# Patient Record
Sex: FEMALE | Race: WHITE | NOT HISPANIC OR LATINO | URBAN - METROPOLITAN AREA
[De-identification: names, ages, dates, MRNs, and addresses within clinical notes are randomized per-mention and may not be internally consistent; named-entity substitution may affect disease eponyms.]

---

## 2018-07-19 ENCOUNTER — EMERGENCY (EMERGENCY)
Facility: HOSPITAL | Age: 33
LOS: 1 days | Discharge: HOME | End: 2018-07-19
Attending: EMERGENCY MEDICINE
Payer: COMMERCIAL

## 2018-07-19 VITALS
RESPIRATION RATE: 20 BRPM | DIASTOLIC BLOOD PRESSURE: 63 MMHG | HEART RATE: 100 BPM | SYSTOLIC BLOOD PRESSURE: 98 MMHG | OXYGEN SATURATION: 100 % | TEMPERATURE: 99 F

## 2018-07-19 LAB
ALBUMIN SERPL ELPH-MCNC: 4.2 G/DL — SIGNIFICANT CHANGE UP (ref 3.3–5)
ALP SERPL-CCNC: 42 U/L — SIGNIFICANT CHANGE UP (ref 40–120)
ALT FLD-CCNC: 14 U/L — SIGNIFICANT CHANGE UP (ref 10–45)
ANION GAP SERPL CALC-SCNC: 12 MMOL/L — SIGNIFICANT CHANGE UP (ref 5–17)
AST SERPL-CCNC: 39 U/L — SIGNIFICANT CHANGE UP (ref 10–40)
BASE EXCESS BLDV CALC-SCNC: 1.2 MMOL/L — SIGNIFICANT CHANGE UP (ref -2–2)
BASOPHILS # BLD AUTO: 0 K/UL — SIGNIFICANT CHANGE UP (ref 0–0.2)
BASOPHILS NFR BLD AUTO: 0.3 % — SIGNIFICANT CHANGE UP (ref 0–2)
BILIRUB SERPL-MCNC: 0.5 MG/DL — SIGNIFICANT CHANGE UP (ref 0.2–1.2)
BUN SERPL-MCNC: 11 MG/DL — SIGNIFICANT CHANGE UP (ref 7–23)
CA-I SERPL-SCNC: 1.1 MMOL/L — LOW (ref 1.12–1.3)
CALCIUM SERPL-MCNC: 8.6 MG/DL — SIGNIFICANT CHANGE UP (ref 8.4–10.5)
CHLORIDE BLDV-SCNC: 103 MMOL/L — SIGNIFICANT CHANGE UP (ref 96–108)
CHLORIDE SERPL-SCNC: 100 MMOL/L — SIGNIFICANT CHANGE UP (ref 96–108)
CO2 BLDV-SCNC: 26 MMOL/L — SIGNIFICANT CHANGE UP (ref 22–30)
CO2 SERPL-SCNC: 22 MMOL/L — SIGNIFICANT CHANGE UP (ref 22–31)
CREAT SERPL-MCNC: 0.71 MG/DL — SIGNIFICANT CHANGE UP (ref 0.5–1.3)
EOSINOPHIL # BLD AUTO: 0 K/UL — SIGNIFICANT CHANGE UP (ref 0–0.5)
EOSINOPHIL NFR BLD AUTO: 0.3 % — SIGNIFICANT CHANGE UP (ref 0–6)
GAS PNL BLDV: 132 MMOL/L — LOW (ref 136–145)
GAS PNL BLDV: SIGNIFICANT CHANGE UP
GAS PNL BLDV: SIGNIFICANT CHANGE UP
GLUCOSE BLDV-MCNC: 93 MG/DL — SIGNIFICANT CHANGE UP (ref 70–99)
GLUCOSE SERPL-MCNC: 103 MG/DL — HIGH (ref 70–99)
HCG SERPL-ACNC: <2 MIU/ML — SIGNIFICANT CHANGE UP
HCO3 BLDV-SCNC: 25 MMOL/L — SIGNIFICANT CHANGE UP (ref 21–29)
HCT VFR BLD CALC: 41.4 % — SIGNIFICANT CHANGE UP (ref 34.5–45)
HCT VFR BLDA CALC: 39 % — SIGNIFICANT CHANGE UP (ref 39–50)
HGB BLD CALC-MCNC: 12.8 G/DL — SIGNIFICANT CHANGE UP (ref 11.5–15.5)
HGB BLD-MCNC: 13.4 G/DL — SIGNIFICANT CHANGE UP (ref 11.5–15.5)
LACTATE BLDV-MCNC: 1.9 MMOL/L — SIGNIFICANT CHANGE UP (ref 0.7–2)
LYMPHOCYTES # BLD AUTO: 0.9 K/UL — LOW (ref 1–3.3)
LYMPHOCYTES # BLD AUTO: 6.7 % — LOW (ref 13–44)
MCHC RBC-ENTMCNC: 28.4 PG — SIGNIFICANT CHANGE UP (ref 27–34)
MCHC RBC-ENTMCNC: 32.5 GM/DL — SIGNIFICANT CHANGE UP (ref 32–36)
MCV RBC AUTO: 87.5 FL — SIGNIFICANT CHANGE UP (ref 80–100)
MONOCYTES # BLD AUTO: 0.6 K/UL — SIGNIFICANT CHANGE UP (ref 0–0.9)
MONOCYTES NFR BLD AUTO: 5 % — SIGNIFICANT CHANGE UP (ref 2–14)
NEUTROPHILS # BLD AUTO: 11.4 K/UL — HIGH (ref 1.8–7.4)
NEUTROPHILS NFR BLD AUTO: 87.7 % — HIGH (ref 43–77)
PCO2 BLDV: 37 MMHG — SIGNIFICANT CHANGE UP (ref 35–50)
PH BLDV: 7.44 — SIGNIFICANT CHANGE UP (ref 7.35–7.45)
PLAT MORPH BLD: NORMAL — SIGNIFICANT CHANGE UP
PLATELET # BLD AUTO: 287 K/UL — SIGNIFICANT CHANGE UP (ref 150–400)
PO2 BLDV: 34 MMHG — SIGNIFICANT CHANGE UP (ref 25–45)
POTASSIUM BLDV-SCNC: 3.3 MMOL/L — LOW (ref 3.5–5.3)
POTASSIUM SERPL-MCNC: 4.7 MMOL/L — SIGNIFICANT CHANGE UP (ref 3.5–5.3)
POTASSIUM SERPL-SCNC: 4.7 MMOL/L — SIGNIFICANT CHANGE UP (ref 3.5–5.3)
PROT SERPL-MCNC: 6.7 G/DL — SIGNIFICANT CHANGE UP (ref 6–8.3)
RBC # BLD: 4.73 M/UL — SIGNIFICANT CHANGE UP (ref 3.8–5.2)
RBC # FLD: 11.3 % — SIGNIFICANT CHANGE UP (ref 10.3–14.5)
RBC BLD AUTO: NORMAL — SIGNIFICANT CHANGE UP
SAO2 % BLDV: 62 % — LOW (ref 67–88)
SODIUM SERPL-SCNC: 134 MMOL/L — LOW (ref 135–145)
WBC # BLD: 13 K/UL — HIGH (ref 3.8–10.5)
WBC # FLD AUTO: 13 K/UL — HIGH (ref 3.8–10.5)

## 2018-07-19 PROCEDURE — 76830 TRANSVAGINAL US NON-OB: CPT | Mod: 26

## 2018-07-19 PROCEDURE — 99284 EMERGENCY DEPT VISIT MOD MDM: CPT

## 2018-07-19 PROCEDURE — 93975 VASCULAR STUDY: CPT | Mod: 26

## 2018-07-19 RX ORDER — ACETAMINOPHEN 500 MG
1000 TABLET ORAL ONCE
Qty: 0 | Refills: 0 | Status: COMPLETED | OUTPATIENT
Start: 2018-07-19 | End: 2018-07-19

## 2018-07-19 RX ADMIN — Medication 1000 MILLIGRAM(S): at 22:23

## 2018-07-19 RX ADMIN — Medication 400 MILLIGRAM(S): at 21:23

## 2018-07-19 NOTE — ED PROVIDER NOTE - ATTENDING CONTRIBUTION TO CARE
Patient receiving fertility treatments, MADISYN Kelsey also with history of PCOS presenting c/o bilateral lower abdominal cramping.  No noted vaginal bleeding.  Recently decreased fertility medications as estrogen levels spiked per patient.  Denying radiation to back or elsewhere in abdomen, no nausea/vomiting/constipations.  , reporting HCG negative when tested in office earlier this week.    On exam patient well appearing, in no apparent distress, vital signs within normal limits, RRR S1/S2, lungs clear to ascultation bilaterally, abdomen soft, minimal bilateral lower abdominal tenderness.    DDx ovarian cyst, less likely torsion given benign exam, less likely ectopic given negative HCG within one week, possible severe mittelschmertz given on fertility treatments, do not suspect non GYN cause of symptoms by history or exam, plan for labs, TVUS, OB consultation given fertility treatments.

## 2018-07-19 NOTE — ED PROVIDER NOTE - OBJECTIVE STATEMENT
33 year old female PMH PCOS presents with lower abdominal pain. Patient is undergoing IVF tx. Was told yesterday her estrogen was too high and today woke up with pain, R more than L. Took Tylenol without relief. Reports had a fever 102 at home. Denies nausea, vomiting, diarrhea, constipation, vaginal bleeding/discharge, dysuria, hematuria. Has had ovarian cysts; never had them rupture.   OB/Gyn: Dr. Kelsey and Dr. Smallwood.

## 2018-07-19 NOTE — ED ADULT NURSE NOTE - OBJECTIVE STATEMENT
Patient presented to ED ambulatory c/o suprapubic pain since last night, pain is sharp as per statement, patient took tylenol at home initially, stating today in the morning she was c/o chills and fever as high as 102. Patient currently in IVF (in vitro fertilization) and has a history of PCOS. After receiving IV tylenol pain went away.

## 2018-07-19 NOTE — ED PROVIDER NOTE - PHYSICAL EXAMINATION
General appearance: NAD, conversant, afebrile    Eyes: anicteric sclerae, moist conjunctivae; no lid-lag; Pupils equal, round and reactive to light; Extraocular muscles intact   HENT: Atraumatic; oropharynx clear with moist mucous membranes and no mucosal ulcerations; normal hard and soft palate; no pharyngeal erythema or exudate   Neck: Trachea midline; Full range of motion, supple; no thyromegaly or lymphadenopathy   Pulm: Lungs clear to auscultation bilaterally, with normal respiratory effort and no intercostal retractions; normal work of breathing   CV: Regular Rhythm and Rate; Normal S1, S2; No murmurs, rubs, or gallops. 2+ peripheral pulses.   Abdomen: Soft, +suprapubic ttp, R>L., non-distended; no masses or hepatosplenomegaly.   Extremities: No peripheral edema or extremity lymphadenopathy. 5/5 strength in all four extremities.   Skin: Normal temperature, turgor and texture; no rash, ulcers or subcutaneous nodules   Psych: Appropriate affect, cooperative; alert and oriented to person, place and time  GYN: KIRK Montgomery as chaperone. Normal external genitalia. Scant whitish discharge in vault. No CMT. Mild b/l adnexal TTP.

## 2018-07-19 NOTE — ED PROVIDER NOTE - CARE PLAN
Principal Discharge DX:	Abdominal pain, unspecified abdominal location  Assessment and plan of treatment:	You were seen today for abdominal pain. Your ultrasound was ne Principal Discharge DX:	Abdominal pain, unspecified abdominal location  Assessment and plan of treatment:	You were seen today for abdominal pain. Your ultrasound did not demonstrate any concerning findings. Please follow up with your endocrinologist tomorrow.  If you have worsening abdominal pain, vaginal discharge, fevers, chills or any other concerning symptoms, please come back to the emergency room.

## 2018-07-19 NOTE — ED PROVIDER NOTE - NS ED ROS FT
General: +fever at home. Np chills, weight loss/weight gain.  HENT: denies nasal congestion, sore throat, rhinorrhea, ear pain  Eyes: denies visual changes, blurred vision, eye discharge, eye redness  Neck: denies neck pain, neck swelling  CV: denies chest pain, palpitations  Resp: denies difficulty breathing, cough  Abdominal: +abdominal pain. No nausea, vomiting, diarrhea, blood in stool, dark stool  GYN: see HPI  MSK: denies muscle aches, bony pain, leg pain, leg swelling  Neuro: denies headaches, numbness, tingling, dizziness, lightheadedness.  Skin: denies rashes, cuts, bruises

## 2018-07-19 NOTE — ED PROVIDER NOTE - MEDICAL DECISION MAKING DETAILS
33F PMH PCOS undergoing IVF presenting with abdominal pain. Concerning for cyst rupture vs torsion. Will obtain basic labs, hcg, TVUS. Ob/gyn consult.

## 2018-07-19 NOTE — ED PROVIDER NOTE - PROGRESS NOTE DETAILS
KIARA Lange MD : pt seen by ob in ED, tvus wnl. urine nonspecific, additional verbal instructions regarding diagnosis, return precautions and follow up plan given to pt and/or family.

## 2018-07-19 NOTE — ED PROVIDER NOTE - PLAN OF CARE
You were seen today for abdominal pain. Your ultrasound was ne You were seen today for abdominal pain. Your ultrasound did not demonstrate any concerning findings. Please follow up with your endocrinologist tomorrow.  If you have worsening abdominal pain, vaginal discharge, fevers, chills or any other concerning symptoms, please come back to the emergency room.

## 2018-07-20 VITALS
TEMPERATURE: 98 F | OXYGEN SATURATION: 100 % | HEART RATE: 72 BPM | SYSTOLIC BLOOD PRESSURE: 94 MMHG | RESPIRATION RATE: 16 BRPM | DIASTOLIC BLOOD PRESSURE: 56 MMHG

## 2018-07-20 DIAGNOSIS — E28.2 POLYCYSTIC OVARIAN SYNDROME: ICD-10-CM

## 2018-07-20 LAB
APPEARANCE UR: CLEAR — SIGNIFICANT CHANGE UP
BILIRUB UR-MCNC: NEGATIVE — SIGNIFICANT CHANGE UP
COLOR SPEC: COLORLESS — SIGNIFICANT CHANGE UP
DIFF PNL FLD: NEGATIVE — SIGNIFICANT CHANGE UP
GLUCOSE UR QL: NEGATIVE — SIGNIFICANT CHANGE UP
KETONES UR-MCNC: NEGATIVE — SIGNIFICANT CHANGE UP
LEUKOCYTE ESTERASE UR-ACNC: NEGATIVE — SIGNIFICANT CHANGE UP
NITRITE UR-MCNC: NEGATIVE — SIGNIFICANT CHANGE UP
PH UR: 6.5 — SIGNIFICANT CHANGE UP (ref 5–8)
PROT UR-MCNC: NEGATIVE — SIGNIFICANT CHANGE UP
SP GR SPEC: <1.005 — LOW (ref 1.01–1.02)
UROBILINOGEN FLD QL: NEGATIVE — SIGNIFICANT CHANGE UP

## 2018-07-20 PROCEDURE — 84132 ASSAY OF SERUM POTASSIUM: CPT

## 2018-07-20 PROCEDURE — 80053 COMPREHEN METABOLIC PANEL: CPT

## 2018-07-20 PROCEDURE — 99284 EMERGENCY DEPT VISIT MOD MDM: CPT | Mod: 25

## 2018-07-20 PROCEDURE — 82435 ASSAY OF BLOOD CHLORIDE: CPT

## 2018-07-20 PROCEDURE — 82803 BLOOD GASES ANY COMBINATION: CPT

## 2018-07-20 PROCEDURE — 85014 HEMATOCRIT: CPT

## 2018-07-20 PROCEDURE — 82947 ASSAY GLUCOSE BLOOD QUANT: CPT

## 2018-07-20 PROCEDURE — 85027 COMPLETE CBC AUTOMATED: CPT

## 2018-07-20 PROCEDURE — 83605 ASSAY OF LACTIC ACID: CPT

## 2018-07-20 PROCEDURE — 96374 THER/PROPH/DIAG INJ IV PUSH: CPT

## 2018-07-20 PROCEDURE — 84702 CHORIONIC GONADOTROPIN TEST: CPT

## 2018-07-20 PROCEDURE — 84295 ASSAY OF SERUM SODIUM: CPT

## 2018-07-20 PROCEDURE — 93975 VASCULAR STUDY: CPT

## 2018-07-20 PROCEDURE — 76830 TRANSVAGINAL US NON-OB: CPT

## 2018-07-20 PROCEDURE — 82330 ASSAY OF CALCIUM: CPT

## 2018-07-20 PROCEDURE — 81003 URINALYSIS AUTO W/O SCOPE: CPT

## 2018-07-20 NOTE — CONSULT NOTE ADULT - SUBJECTIVE AND OBJECTIVE BOX
33y  LMP 18 with history of PCOS who presents with RLQ pain throughout the day. She denies any inciting event. She took Tylenol at home with minimal relief. She also notes that she had a fever at home up to 102 Farenheit today with some associated nausea. She vomited once in the morning, but the nausea has since resolved. She also does note some urinary frequency. She denies any bleeding or discharge. She denies any shortness of breath, chest pain, constipation, diarrhea, dysuria and hematuria.        OB/GYN HISTORY:   PAST MEDICAL & SURGICAL HISTORY:  PCOS (polycystic ovarian syndrome)  No significant past surgical history    Allergies    No Known Allergies    Intolerances      MEDICATIONS: metformin    FAMILY HISTORY:  No pertinent family history in first degree relatives    SOCIAL HISTORY:  denies tobacco, alcohol, and illicit drugs    Name of GYN Physician: Low       Vital Signs Last 24 Hrs  T(C): 37 (2018 23:00), Max: 37.1 (2018 19:50)  T(F): 98.6 (2018 23:00), Max: 98.8 (2018 19:50)  HR: 86 (2018 23:00) (86 - 100)  BP: 95/76 (2018 23:00) (95/76 - 98/63)  BP(mean): --  RR: 17 (2018 23:00) (17 - 20)  SpO2: 100% (2018 23:00) (100% - 100%)    PHYSICAL EXAM:      Constitutional: alert and oriented x 3    Respiratory: clear    Cardiovascular: regular rate and rhythm    Gastrointestinal: soft, mildly tender in b/l lower quadrants and suprapubically, + bowel sounds. no palpable masses    Cervix: closed/ long, no CMT  Uterus: normal size, non tender  Adnexa: mild tenderness bilaterally, no palpable masses    LABS:                        13.4   13.0  )-----------( 287      ( 2018 22:00 )             41.4     07-    134<L>  |  100  |  11  ----------------------------<  103<H>  4.7   |  22  |  0.71    Ca    8.6      2018 22:00    TPro  6.7  /  Alb  4.2  /  TBili  0.5  /  DBili  x   /  AST  39  /  ALT  14  /  AlkPhos  42                RADIOLOGY & ADDITIONAL STUDIES:  TVUS (): hyperstimulated ovaries, trace free fluid in the cul-de-sac, good flow to both ovaries

## 2018-07-20 NOTE — CONSULT NOTE ADULT - PROBLEM SELECTOR RECOMMENDATION 9
-recommend UA with elevated white count and fevers at home with suprapubic tenderness  -ovarian torsion unlikely, good flow on sono, and benign physical exam  -pt to follow up with endocrinologist tomorrow    Discussed with Dr. Low Gresham MD PGY2

## 2018-10-24 ENCOUNTER — TRANSCRIPTION ENCOUNTER (OUTPATIENT)
Age: 33
End: 2018-10-24

## 2018-10-24 ENCOUNTER — OUTPATIENT (OUTPATIENT)
Dept: OUTPATIENT SERVICES | Facility: HOSPITAL | Age: 33
LOS: 1 days | End: 2018-10-24
Payer: COMMERCIAL

## 2018-10-24 VITALS
SYSTOLIC BLOOD PRESSURE: 117 MMHG | RESPIRATION RATE: 20 BRPM | TEMPERATURE: 98 F | DIASTOLIC BLOOD PRESSURE: 76 MMHG | HEIGHT: 66 IN | HEART RATE: 62 BPM | WEIGHT: 130.95 LBS | OXYGEN SATURATION: 99 %

## 2018-10-24 DIAGNOSIS — O02.1 MISSED ABORTION: ICD-10-CM

## 2018-10-24 LAB
ANION GAP SERPL CALC-SCNC: 14 MMOL/L — SIGNIFICANT CHANGE UP (ref 5–17)
BLD GP AB SCN SERPL QL: NEGATIVE — SIGNIFICANT CHANGE UP
BUN SERPL-MCNC: 7 MG/DL — SIGNIFICANT CHANGE UP (ref 7–23)
CALCIUM SERPL-MCNC: 9.2 MG/DL — SIGNIFICANT CHANGE UP (ref 8.4–10.5)
CHLORIDE SERPL-SCNC: 103 MMOL/L — SIGNIFICANT CHANGE UP (ref 96–108)
CO2 SERPL-SCNC: 21 MMOL/L — LOW (ref 22–31)
CREAT SERPL-MCNC: 0.56 MG/DL — SIGNIFICANT CHANGE UP (ref 0.5–1.3)
GLUCOSE SERPL-MCNC: 96 MG/DL — SIGNIFICANT CHANGE UP (ref 70–99)
HCT VFR BLD CALC: 37.9 % — SIGNIFICANT CHANGE UP (ref 34.5–45)
HGB BLD-MCNC: 13.1 G/DL — SIGNIFICANT CHANGE UP (ref 11.5–15.5)
MCHC RBC-ENTMCNC: 29.3 PG — SIGNIFICANT CHANGE UP (ref 27–34)
MCHC RBC-ENTMCNC: 34.6 GM/DL — SIGNIFICANT CHANGE UP (ref 32–36)
MCV RBC AUTO: 84.8 FL — SIGNIFICANT CHANGE UP (ref 80–100)
PLATELET # BLD AUTO: 334 K/UL — SIGNIFICANT CHANGE UP (ref 150–400)
POTASSIUM SERPL-MCNC: 3.9 MMOL/L — SIGNIFICANT CHANGE UP (ref 3.5–5.3)
POTASSIUM SERPL-SCNC: 3.9 MMOL/L — SIGNIFICANT CHANGE UP (ref 3.5–5.3)
RBC # BLD: 4.47 M/UL — SIGNIFICANT CHANGE UP (ref 3.8–5.2)
RBC # FLD: 12.4 % — SIGNIFICANT CHANGE UP (ref 10.3–14.5)
RH IG SCN BLD-IMP: POSITIVE — SIGNIFICANT CHANGE UP
SODIUM SERPL-SCNC: 138 MMOL/L — SIGNIFICANT CHANGE UP (ref 135–145)
WBC # BLD: 10.09 K/UL — SIGNIFICANT CHANGE UP (ref 3.8–10.5)
WBC # FLD AUTO: 10.09 K/UL — SIGNIFICANT CHANGE UP (ref 3.8–10.5)

## 2018-10-24 PROCEDURE — 86900 BLOOD TYPING SEROLOGIC ABO: CPT

## 2018-10-24 PROCEDURE — 86850 RBC ANTIBODY SCREEN: CPT

## 2018-10-24 PROCEDURE — 86901 BLOOD TYPING SEROLOGIC RH(D): CPT

## 2018-10-24 PROCEDURE — G0463: CPT

## 2018-10-24 PROCEDURE — 85027 COMPLETE CBC AUTOMATED: CPT

## 2018-10-24 PROCEDURE — 80048 BASIC METABOLIC PNL TOTAL CA: CPT

## 2018-10-24 RX ORDER — SODIUM CHLORIDE 9 MG/ML
3 INJECTION INTRAMUSCULAR; INTRAVENOUS; SUBCUTANEOUS EVERY 8 HOURS
Qty: 0 | Refills: 0 | Status: DISCONTINUED | OUTPATIENT
Start: 2018-10-25 | End: 2018-11-09

## 2018-10-24 RX ORDER — ACETAMINOPHEN 500 MG
1000 TABLET ORAL ONCE
Qty: 0 | Refills: 0 | Status: COMPLETED | OUTPATIENT
Start: 2018-10-25 | End: 2018-10-25

## 2018-10-24 RX ORDER — CELECOXIB 200 MG/1
200 CAPSULE ORAL ONCE
Qty: 0 | Refills: 0 | Status: COMPLETED | OUTPATIENT
Start: 2018-10-25 | End: 2018-10-25

## 2018-10-24 RX ORDER — LIDOCAINE HCL 20 MG/ML
0.2 VIAL (ML) INJECTION ONCE
Qty: 0 | Refills: 0 | Status: DISCONTINUED | OUTPATIENT
Start: 2018-10-25 | End: 2018-11-09

## 2018-10-24 NOTE — H&P PST ADULT - RS GEN PE MLT RESP DETAILS PC
good air movement/breath sounds equal/clear to auscultation bilaterally/airway patent/normal/respirations non-labored

## 2018-10-24 NOTE — H&P PST ADULT - HISTORY OF PRESENT ILLNESS
33 yr old female A3, IVF pregnancy- 11 weeks with missed , Now coming in for D & C Suction for missed  on 10/25/18.

## 2018-10-24 NOTE — H&P PST ADULT - NSANTHOSAYNRD_GEN_A_CORE
No. KAROLYN screening performed.  STOP BANG Legend: 0-2 = LOW Risk; 3-4 = INTERMEDIATE Risk; 5-8 = HIGH Risk

## 2018-10-25 ENCOUNTER — OUTPATIENT (OUTPATIENT)
Dept: OUTPATIENT SERVICES | Facility: HOSPITAL | Age: 33
LOS: 1 days | End: 2018-10-25
Payer: COMMERCIAL

## 2018-10-25 ENCOUNTER — RESULT REVIEW (OUTPATIENT)
Age: 33
End: 2018-10-25

## 2018-10-25 VITALS
HEART RATE: 77 BPM | TEMPERATURE: 98 F | RESPIRATION RATE: 20 BRPM | OXYGEN SATURATION: 100 % | WEIGHT: 130.95 LBS | HEIGHT: 66 IN | SYSTOLIC BLOOD PRESSURE: 117 MMHG | DIASTOLIC BLOOD PRESSURE: 76 MMHG

## 2018-10-25 VITALS — DIASTOLIC BLOOD PRESSURE: 52 MMHG | SYSTOLIC BLOOD PRESSURE: 95 MMHG | OXYGEN SATURATION: 99 % | HEART RATE: 60 BPM

## 2018-10-25 DIAGNOSIS — O02.1 MISSED ABORTION: ICD-10-CM

## 2018-10-25 PROBLEM — E28.2 POLYCYSTIC OVARIAN SYNDROME: Chronic | Status: ACTIVE | Noted: 2018-07-19

## 2018-10-25 PROCEDURE — 88305 TISSUE EXAM BY PATHOLOGIST: CPT

## 2018-10-25 PROCEDURE — 88305 TISSUE EXAM BY PATHOLOGIST: CPT | Mod: 26

## 2018-10-25 PROCEDURE — 59820 CARE OF MISCARRIAGE: CPT

## 2018-10-25 RX ORDER — ONDANSETRON 8 MG/1
4 TABLET, FILM COATED ORAL ONCE
Qty: 0 | Refills: 0 | Status: COMPLETED | OUTPATIENT
Start: 2018-10-25 | End: 2018-10-25

## 2018-10-25 RX ORDER — METFORMIN HYDROCHLORIDE 850 MG/1
1.5 TABLET ORAL
Qty: 0 | Refills: 0 | COMMUNITY

## 2018-10-25 RX ORDER — OXYCODONE HYDROCHLORIDE 5 MG/1
5 TABLET ORAL ONCE
Qty: 0 | Refills: 0 | Status: DISCONTINUED | OUTPATIENT
Start: 2018-10-25 | End: 2018-10-25

## 2018-10-25 RX ORDER — HYDROMORPHONE HYDROCHLORIDE 2 MG/ML
0.25 INJECTION INTRAMUSCULAR; INTRAVENOUS; SUBCUTANEOUS
Qty: 0 | Refills: 0 | Status: DISCONTINUED | OUTPATIENT
Start: 2018-10-25 | End: 2018-10-25

## 2018-10-25 RX ORDER — SODIUM CHLORIDE 9 MG/ML
1000 INJECTION, SOLUTION INTRAVENOUS
Qty: 0 | Refills: 0 | Status: DISCONTINUED | OUTPATIENT
Start: 2018-10-25 | End: 2018-11-09

## 2018-10-25 RX ORDER — CELECOXIB 200 MG/1
200 CAPSULE ORAL ONCE
Qty: 0 | Refills: 0 | Status: COMPLETED | OUTPATIENT
Start: 2018-10-25 | End: 2018-10-25

## 2018-10-25 RX ADMIN — CELECOXIB 200 MILLIGRAM(S): 200 CAPSULE ORAL at 07:07

## 2018-10-25 RX ADMIN — ONDANSETRON 4 MILLIGRAM(S): 8 TABLET, FILM COATED ORAL at 09:01

## 2018-10-25 RX ADMIN — CELECOXIB 200 MILLIGRAM(S): 200 CAPSULE ORAL at 09:00

## 2018-10-25 RX ADMIN — Medication 1000 MILLIGRAM(S): at 07:06

## 2018-10-25 RX ADMIN — OXYCODONE HYDROCHLORIDE 5 MILLIGRAM(S): 5 TABLET ORAL at 09:00

## 2018-10-25 RX ADMIN — HYDROMORPHONE HYDROCHLORIDE 0.25 MILLIGRAM(S): 2 INJECTION INTRAMUSCULAR; INTRAVENOUS; SUBCUTANEOUS at 09:01

## 2018-10-25 NOTE — ASU DISCHARGE PLAN (ADULT/PEDIATRIC). - NURSING INSTRUCTIONS
Tylenol/Motrin as needed for pain/discomfort.  Next dose of Tylenol OK @ 1:00pm.  Next dose of Motrin (or other NSAIDs) OK @ 3:00pm.  Pads only, nothing vaginally until cleared by MD.  Follow with MD as requested; call office for appt.  You may resume any/all preop medications/supplements as before.

## 2018-10-25 NOTE — BRIEF OPERATIVE NOTE - PROCEDURE
<<-----Click on this checkbox to enter Procedure Dilation and curettage of uterus for missed   10/25/2018  8 wks twins  Active  MSPEREZ

## 2018-10-29 LAB — SURGICAL PATHOLOGY STUDY: SIGNIFICANT CHANGE UP

## 2018-11-30 NOTE — ASU PREOP CHECKLIST - NOTHING BY MOUTH SINCE
[Disease: _____________________] : Disease: [unfilled] [T: ___] : T[unfilled] [N: ___] : N[unfilled] [M: ___] : M[unfilled] [AJCC Stage: ____] : AJCC Stage: [unfilled] [de-identified] : Presented in 2007 with an abnormal mammogram. On 11/14/2007 she underwent left breast lumpectomy and sentinel lymph node biopsy for stage I A ductal/ lobular cancer , strongly ER/DC positive. Received adjuvant radiation and started adjuvant aromatase inhibitor in February 2008 ( Femara, changed to Arimidex due to cost issues).She opted for extended adjuvant hormonal therapy. Completed 10 years in February 2018.\par \par Routine blood work by PCP in  October 2017 showed normal Hgb 14.3 with ferritin 537 and iron sat 53 %). she stopped multivitamin with iron. No history of hemochromatosis in the family. 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 She was not interested in going to Blood center for phlebotomies.  [de-identified] : ductal with focal lobular features  [de-identified] : ER > 80 %  IL > 80 %  HER 2 negative  by FISH [de-identified] : Feels well. Has no complaints. Last phlebotomy in September.  24-Oct-2018 23:00

## 2019-08-19 ENCOUNTER — ASOB RESULT (OUTPATIENT)
Age: 34
End: 2019-08-19

## 2019-08-19 ENCOUNTER — LABORATORY RESULT (OUTPATIENT)
Age: 34
End: 2019-08-19

## 2019-08-19 ENCOUNTER — APPOINTMENT (OUTPATIENT)
Dept: ANTEPARTUM | Facility: CLINIC | Age: 34
End: 2019-08-19
Payer: COMMERCIAL

## 2019-08-19 PROCEDURE — 76805 OB US >/= 14 WKS SNGL FETUS: CPT

## 2019-08-19 PROCEDURE — 99202 OFFICE O/P NEW SF 15 MIN: CPT

## 2019-08-26 ENCOUNTER — ASOB RESULT (OUTPATIENT)
Age: 34
End: 2019-08-26

## 2019-08-26 ENCOUNTER — APPOINTMENT (OUTPATIENT)
Dept: ANTEPARTUM | Facility: CLINIC | Age: 34
End: 2019-08-26
Payer: COMMERCIAL

## 2019-08-26 PROCEDURE — 76817 TRANSVAGINAL US OBSTETRIC: CPT

## 2019-08-26 PROCEDURE — 99213 OFFICE O/P EST LOW 20 MIN: CPT | Mod: 25

## 2019-08-26 PROCEDURE — 76815 OB US LIMITED FETUS(S): CPT

## 2019-09-03 ENCOUNTER — APPOINTMENT (OUTPATIENT)
Dept: ANTEPARTUM | Facility: CLINIC | Age: 34
End: 2019-09-03
Payer: COMMERCIAL

## 2019-09-03 ENCOUNTER — ASOB RESULT (OUTPATIENT)
Age: 34
End: 2019-09-03

## 2019-09-03 PROCEDURE — 76817 TRANSVAGINAL US OBSTETRIC: CPT

## 2019-09-16 ENCOUNTER — APPOINTMENT (OUTPATIENT)
Dept: ANTEPARTUM | Facility: CLINIC | Age: 34
End: 2019-09-16
Payer: COMMERCIAL

## 2019-09-16 ENCOUNTER — ASOB RESULT (OUTPATIENT)
Age: 34
End: 2019-09-16

## 2019-09-16 PROCEDURE — 76811 OB US DETAILED SNGL FETUS: CPT

## 2019-09-16 PROCEDURE — 76817 TRANSVAGINAL US OBSTETRIC: CPT

## 2020-01-26 ENCOUNTER — INPATIENT (INPATIENT)
Facility: HOSPITAL | Age: 35
LOS: 1 days | Discharge: HOME | End: 2020-01-28
Attending: OBSTETRICS & GYNECOLOGY | Admitting: OBSTETRICS & GYNECOLOGY
Payer: COMMERCIAL

## 2020-01-26 VITALS — HEIGHT: 66 IN | WEIGHT: 167.55 LBS

## 2020-01-26 DIAGNOSIS — Z3A.00 WEEKS OF GESTATION OF PREGNANCY NOT SPECIFIED: ICD-10-CM

## 2020-01-26 DIAGNOSIS — O26.899 OTHER SPECIFIED PREGNANCY RELATED CONDITIONS, UNSPECIFIED TRIMESTER: ICD-10-CM

## 2020-01-26 DIAGNOSIS — Z34.80 ENCOUNTER FOR SUPERVISION OF OTHER NORMAL PREGNANCY, UNSPECIFIED TRIMESTER: ICD-10-CM

## 2020-01-26 LAB
BASOPHILS # BLD AUTO: 0.04 K/UL — SIGNIFICANT CHANGE UP (ref 0–0.2)
BASOPHILS NFR BLD AUTO: 0.4 % — SIGNIFICANT CHANGE UP (ref 0–2)
BLD GP AB SCN SERPL QL: NEGATIVE — SIGNIFICANT CHANGE UP
EOSINOPHIL # BLD AUTO: 0.14 K/UL — SIGNIFICANT CHANGE UP (ref 0–0.5)
EOSINOPHIL NFR BLD AUTO: 1.5 % — SIGNIFICANT CHANGE UP (ref 0–6)
HCT VFR BLD CALC: 38.9 % — SIGNIFICANT CHANGE UP (ref 34.5–45)
HGB BLD-MCNC: 12.7 G/DL — SIGNIFICANT CHANGE UP (ref 11.5–15.5)
IMM GRANULOCYTES NFR BLD AUTO: 0.9 % — SIGNIFICANT CHANGE UP (ref 0–1.5)
LYMPHOCYTES # BLD AUTO: 1.66 K/UL — SIGNIFICANT CHANGE UP (ref 1–3.3)
LYMPHOCYTES # BLD AUTO: 17.4 % — SIGNIFICANT CHANGE UP (ref 13–44)
MCHC RBC-ENTMCNC: 29.2 PG — SIGNIFICANT CHANGE UP (ref 27–34)
MCHC RBC-ENTMCNC: 32.6 GM/DL — SIGNIFICANT CHANGE UP (ref 32–36)
MCV RBC AUTO: 89.4 FL — SIGNIFICANT CHANGE UP (ref 80–100)
MONOCYTES # BLD AUTO: 0.91 K/UL — HIGH (ref 0–0.9)
MONOCYTES NFR BLD AUTO: 9.6 % — SIGNIFICANT CHANGE UP (ref 2–14)
NEUTROPHILS # BLD AUTO: 6.68 K/UL — SIGNIFICANT CHANGE UP (ref 1.8–7.4)
NEUTROPHILS NFR BLD AUTO: 70.2 % — SIGNIFICANT CHANGE UP (ref 43–77)
NRBC # BLD: 0 /100 WBCS — SIGNIFICANT CHANGE UP (ref 0–0)
PLATELET # BLD AUTO: 206 K/UL — SIGNIFICANT CHANGE UP (ref 150–400)
RBC # BLD: 4.35 M/UL — SIGNIFICANT CHANGE UP (ref 3.8–5.2)
RBC # FLD: 12.3 % — SIGNIFICANT CHANGE UP (ref 10.3–14.5)
RH IG SCN BLD-IMP: POSITIVE — SIGNIFICANT CHANGE UP
T PALLIDUM AB TITR SER: NEGATIVE — SIGNIFICANT CHANGE UP
WBC # BLD: 9.52 K/UL — SIGNIFICANT CHANGE UP (ref 3.8–10.5)
WBC # FLD AUTO: 9.52 K/UL — SIGNIFICANT CHANGE UP (ref 3.8–10.5)

## 2020-01-26 RX ORDER — GLYCERIN ADULT
1 SUPPOSITORY, RECTAL RECTAL AT BEDTIME
Refills: 0 | Status: DISCONTINUED | OUTPATIENT
Start: 2020-01-26 | End: 2020-01-28

## 2020-01-26 RX ORDER — ONDANSETRON 8 MG/1
4 TABLET, FILM COATED ORAL ONCE
Refills: 0 | Status: DISCONTINUED | OUTPATIENT
Start: 2020-01-26 | End: 2020-01-28

## 2020-01-26 RX ORDER — OXYCODONE HYDROCHLORIDE 5 MG/1
5 TABLET ORAL ONCE
Refills: 0 | Status: DISCONTINUED | OUTPATIENT
Start: 2020-01-26 | End: 2020-01-28

## 2020-01-26 RX ORDER — CITRIC ACID/SODIUM CITRATE 300-500 MG
15 SOLUTION, ORAL ORAL EVERY 6 HOURS
Refills: 0 | Status: DISCONTINUED | OUTPATIENT
Start: 2020-01-26 | End: 2020-01-26

## 2020-01-26 RX ORDER — IBUPROFEN 200 MG
600 TABLET ORAL EVERY 6 HOURS
Refills: 0 | Status: COMPLETED | OUTPATIENT
Start: 2020-01-26 | End: 2020-12-24

## 2020-01-26 RX ORDER — KETOROLAC TROMETHAMINE 30 MG/ML
30 SYRINGE (ML) INJECTION ONCE
Refills: 0 | Status: DISCONTINUED | OUTPATIENT
Start: 2020-01-26 | End: 2020-01-26

## 2020-01-26 RX ORDER — SIMETHICONE 80 MG/1
80 TABLET, CHEWABLE ORAL EVERY 4 HOURS
Refills: 0 | Status: DISCONTINUED | OUTPATIENT
Start: 2020-01-26 | End: 2020-01-28

## 2020-01-26 RX ORDER — MAGNESIUM HYDROXIDE 400 MG/1
30 TABLET, CHEWABLE ORAL
Refills: 0 | Status: DISCONTINUED | OUTPATIENT
Start: 2020-01-26 | End: 2020-01-28

## 2020-01-26 RX ORDER — SODIUM CHLORIDE 9 MG/ML
1000 INJECTION, SOLUTION INTRAVENOUS
Refills: 0 | Status: DISCONTINUED | OUTPATIENT
Start: 2020-01-26 | End: 2020-01-26

## 2020-01-26 RX ORDER — OXYTOCIN 10 UNIT/ML
333.33 VIAL (ML) INJECTION
Qty: 20 | Refills: 0 | Status: DISCONTINUED | OUTPATIENT
Start: 2020-01-26 | End: 2020-01-28

## 2020-01-26 RX ORDER — BENZOCAINE 10 %
1 GEL (GRAM) MUCOUS MEMBRANE EVERY 6 HOURS
Refills: 0 | Status: DISCONTINUED | OUTPATIENT
Start: 2020-01-26 | End: 2020-01-28

## 2020-01-26 RX ORDER — TETANUS TOXOID, REDUCED DIPHTHERIA TOXOID AND ACELLULAR PERTUSSIS VACCINE, ADSORBED 5; 2.5; 8; 8; 2.5 [IU]/.5ML; [IU]/.5ML; UG/.5ML; UG/.5ML; UG/.5ML
0.5 SUSPENSION INTRAMUSCULAR ONCE
Refills: 0 | Status: DISCONTINUED | OUTPATIENT
Start: 2020-01-26 | End: 2020-01-28

## 2020-01-26 RX ORDER — ACETAMINOPHEN 500 MG
975 TABLET ORAL
Refills: 0 | Status: DISCONTINUED | OUTPATIENT
Start: 2020-01-26 | End: 2020-01-28

## 2020-01-26 RX ORDER — HYDROCORTISONE 1 %
1 OINTMENT (GRAM) TOPICAL EVERY 6 HOURS
Refills: 0 | Status: DISCONTINUED | OUTPATIENT
Start: 2020-01-26 | End: 2020-01-28

## 2020-01-26 RX ORDER — DIBUCAINE 1 %
1 OINTMENT (GRAM) RECTAL EVERY 6 HOURS
Refills: 0 | Status: DISCONTINUED | OUTPATIENT
Start: 2020-01-26 | End: 2020-01-28

## 2020-01-26 RX ORDER — OXYTOCIN 10 UNIT/ML
4 VIAL (ML) INJECTION
Qty: 30 | Refills: 0 | Status: DISCONTINUED | OUTPATIENT
Start: 2020-01-26 | End: 2020-01-26

## 2020-01-26 RX ORDER — DIPHENHYDRAMINE HCL 50 MG
25 CAPSULE ORAL EVERY 6 HOURS
Refills: 0 | Status: DISCONTINUED | OUTPATIENT
Start: 2020-01-26 | End: 2020-01-28

## 2020-01-26 RX ORDER — SODIUM CHLORIDE 9 MG/ML
3 INJECTION INTRAMUSCULAR; INTRAVENOUS; SUBCUTANEOUS EVERY 8 HOURS
Refills: 0 | Status: DISCONTINUED | OUTPATIENT
Start: 2020-01-26 | End: 2020-01-28

## 2020-01-26 RX ORDER — ONDANSETRON 8 MG/1
4 TABLET, FILM COATED ORAL ONCE
Refills: 0 | Status: COMPLETED | OUTPATIENT
Start: 2020-01-26 | End: 2020-01-26

## 2020-01-26 RX ORDER — OXYTOCIN 10 UNIT/ML
333.33 VIAL (ML) INJECTION
Qty: 20 | Refills: 0 | Status: DISCONTINUED | OUTPATIENT
Start: 2020-01-26 | End: 2020-01-26

## 2020-01-26 RX ORDER — AER TRAVELER 0.5 G/1
1 SOLUTION RECTAL; TOPICAL EVERY 4 HOURS
Refills: 0 | Status: DISCONTINUED | OUTPATIENT
Start: 2020-01-26 | End: 2020-01-28

## 2020-01-26 RX ORDER — PRAMOXINE HYDROCHLORIDE 150 MG/15G
1 AEROSOL, FOAM RECTAL EVERY 4 HOURS
Refills: 0 | Status: DISCONTINUED | OUTPATIENT
Start: 2020-01-26 | End: 2020-01-28

## 2020-01-26 RX ORDER — OXYCODONE HYDROCHLORIDE 5 MG/1
5 TABLET ORAL
Refills: 0 | Status: DISCONTINUED | OUTPATIENT
Start: 2020-01-26 | End: 2020-01-28

## 2020-01-26 RX ORDER — LANOLIN
1 OINTMENT (GRAM) TOPICAL EVERY 6 HOURS
Refills: 0 | Status: DISCONTINUED | OUTPATIENT
Start: 2020-01-26 | End: 2020-01-28

## 2020-01-26 RX ORDER — IBUPROFEN 200 MG
600 TABLET ORAL EVERY 6 HOURS
Refills: 0 | Status: DISCONTINUED | OUTPATIENT
Start: 2020-01-26 | End: 2020-01-28

## 2020-01-26 RX ADMIN — Medication 600 MILLIGRAM(S): at 23:23

## 2020-01-26 RX ADMIN — ONDANSETRON 4 MILLIGRAM(S): 8 TABLET, FILM COATED ORAL at 06:36

## 2020-01-26 RX ADMIN — Medication 4 MILLIUNIT(S)/MIN: at 08:09

## 2020-01-26 RX ADMIN — Medication 600 MILLIGRAM(S): at 19:02

## 2020-01-26 RX ADMIN — Medication 975 MILLIGRAM(S): at 20:29

## 2020-01-26 RX ADMIN — Medication 1000 MILLIUNIT(S)/MIN: at 11:35

## 2020-01-26 RX ADMIN — Medication 975 MILLIGRAM(S): at 15:09

## 2020-01-26 RX ADMIN — Medication 975 MILLIGRAM(S): at 15:39

## 2020-01-26 RX ADMIN — Medication 975 MILLIGRAM(S): at 21:29

## 2020-01-26 RX ADMIN — Medication 30 MILLIGRAM(S): at 13:01

## 2020-01-26 RX ADMIN — SODIUM CHLORIDE 3 MILLILITER(S): 9 INJECTION INTRAMUSCULAR; INTRAVENOUS; SUBCUTANEOUS at 14:00

## 2020-01-26 RX ADMIN — Medication 30 MILLIGRAM(S): at 13:40

## 2020-01-27 LAB
HCT VFR BLD CALC: 36.2 % — SIGNIFICANT CHANGE UP (ref 34.5–45)
HGB BLD-MCNC: 11.5 G/DL — SIGNIFICANT CHANGE UP (ref 11.5–15.5)

## 2020-01-27 RX ADMIN — Medication 600 MILLIGRAM(S): at 05:23

## 2020-01-27 RX ADMIN — Medication 975 MILLIGRAM(S): at 20:32

## 2020-01-27 RX ADMIN — Medication 600 MILLIGRAM(S): at 12:30

## 2020-01-27 RX ADMIN — Medication 600 MILLIGRAM(S): at 13:30

## 2020-01-27 RX ADMIN — Medication 1 TABLET(S): at 12:30

## 2020-01-27 RX ADMIN — Medication 975 MILLIGRAM(S): at 03:56

## 2020-01-27 RX ADMIN — Medication 975 MILLIGRAM(S): at 10:05

## 2020-01-27 RX ADMIN — Medication 600 MILLIGRAM(S): at 00:23

## 2020-01-27 RX ADMIN — Medication 600 MILLIGRAM(S): at 06:23

## 2020-01-27 RX ADMIN — Medication 975 MILLIGRAM(S): at 02:56

## 2020-01-27 RX ADMIN — OXYCODONE HYDROCHLORIDE 5 MILLIGRAM(S): 5 TABLET ORAL at 06:49

## 2020-01-27 RX ADMIN — Medication 975 MILLIGRAM(S): at 09:07

## 2020-01-27 RX ADMIN — Medication 975 MILLIGRAM(S): at 16:13

## 2020-01-27 RX ADMIN — Medication 975 MILLIGRAM(S): at 21:30

## 2020-01-27 RX ADMIN — OXYCODONE HYDROCHLORIDE 5 MILLIGRAM(S): 5 TABLET ORAL at 05:49

## 2020-01-27 RX ADMIN — Medication 600 MILLIGRAM(S): at 18:13

## 2020-01-27 RX ADMIN — Medication 975 MILLIGRAM(S): at 17:10

## 2020-01-27 NOTE — PROGRESS NOTE ADULT - PROBLEM SELECTOR PLAN 1
Plan:   - Increase ambulation, SCDs when not ambulating  - Continue PO pain regimen: Ibuprofen, Acetaminophen and PRN Oxycodone   - PPD#1 H/H pending   - Continue regular diet  - Discharge planning      Diamond, PGY-1

## 2020-01-27 NOTE — PROVIDER CONTACT NOTE (OTHER) - ACTION/TREATMENT ORDERED:
MD to RN, administer oxycodone 5 mg, 1st year to round on pt following sign out, will continue to monitor

## 2020-01-27 NOTE — PROGRESS NOTE ADULT - SUBJECTIVE AND OBJECTIVE BOX
34yo now PPD1 after an uncomplicated  seen and examined at bedside, no acute overnight events. Patient denies current complaints, her pain is well controlled. States she is ambulating, voiding spontaneously, passing gas, and tolerating regular diet. Denies CP, SOB, N/V, HA, blurred vision, epigastric pain.    Vital Signs Last 24 Hours  T(C): 36.5 (20 @ 21:00), Max: 37.1 (20 @ 10:40)  HR: 66 (20 @ 21:00) (63 - 70)  BP: 98/66 (20 @ 21:00) (98/66 - 134/61)  RR: 18 (20 @ 21:00) (12 - 18)  SpO2: 96% (20 @ 21:00) (91% - 100%)    Physical Exam:  General: NAD, resting comfortably in bed   Abdomen: Soft, non-tender, non-distended, fundus firm  Extremities: Full ROM, moving all extremities spontaneously  Pelvic: Lochia wnl    Labs:    Blood Type: A Positive  Antibody Screen: Negative  RPR: Negative               12.7   9.52  )-----------( 206      (  @ 05:51 )             38.9         MEDICATIONS  (STANDING):  acetaminophen   Tablet .. 975 milliGRAM(s) Oral <User Schedule>  diphtheria/tetanus/pertussis (acellular) Vaccine (ADAcel) 0.5 milliLiter(s) IntraMuscular once  ibuprofen  Tablet. 600 milliGRAM(s) Oral every 6 hours  ondansetron Injectable 4 milliGRAM(s) IV Push once  oxytocin Infusion 333.333 milliUNIT(s)/Min (1000 mL/Hr) IV Continuous <Continuous>  prenatal multivitamin 1 Tablet(s) Oral daily  sodium chloride 0.9% lock flush 3 milliLiter(s) IV Push every 8 hours    MEDICATIONS  (PRN):  benzocaine 20%/menthol 0.5% Spray 1 Spray(s) Topical every 6 hours PRN for Perineal discomfort  dibucaine 1% Ointment 1 Application(s) Topical every 6 hours PRN Perineal discomfort  diphenhydrAMINE 25 milliGRAM(s) Oral every 6 hours PRN Pruritus  glycerin Suppository - Adult 1 Suppository(s) Rectal at bedtime PRN Constipation  hydrocortisone 1% Cream 1 Application(s) Topical every 6 hours PRN Moderate Pain (4-6)  lanolin Ointment 1 Application(s) Topical every 6 hours PRN nipple soreness  magnesium hydroxide Suspension 30 milliLiter(s) Oral two times a day PRN Constipation  oxyCODONE    IR 5 milliGRAM(s) Oral every 3 hours PRN Moderate to Severe Pain (4-10)  oxyCODONE    IR 5 milliGRAM(s) Oral once PRN Moderate to Severe Pain (4-10)  pramoxine 1%/zinc 5% Cream 1 Application(s) Topical every 4 hours PRN Moderate Pain (4-6)  simethicone 80 milliGRAM(s) Chew every 4 hours PRN Gas  witch hazel Pads 1 Application(s) Topical every 4 hours PRN Perineal discomfort

## 2020-01-27 NOTE — CHART NOTE - NSCHARTNOTEFT_GEN_A_CORE
Patient examined secondary to nurse observing a bruise in the perineal space.  Upon examination there is blueish discoloration and minor swelling stretching from the posterior aspect of the right labia to the anal orifice.  This area is non tender to soft palpation, nonfluctuant, and well circumscribed.  Patient denies any new pain in the area, and denies N/V/fever/chills/changes or difficulty with urination.    VS  T(C): 36.8 (01-27-20 @ 08:59)  HR: 82 (01-27-20 @ 08:59)  BP: 107/72 (01-27-20 @ 08:59)  RR: 18 (01-27-20 @ 08:59)  SpO2: 99% (01-27-20 @ 08:59)    Recommended applying ice pack intermittently to area and reassessing.      NATHANIEL w/ Dr. Elpidio Washington, PGY1

## 2020-01-27 NOTE — PROVIDER CONTACT NOTE (OTHER) - ASSESSMENT
Pt c/o pain, swelling, stiffness associated of R hand/wrist with Carpal tunnel syndrome  R hand is visibly more swollen than L

## 2020-01-28 ENCOUNTER — TRANSCRIPTION ENCOUNTER (OUTPATIENT)
Age: 35
End: 2020-01-28

## 2020-01-28 VITALS
SYSTOLIC BLOOD PRESSURE: 126 MMHG | OXYGEN SATURATION: 98 % | HEART RATE: 86 BPM | DIASTOLIC BLOOD PRESSURE: 79 MMHG | RESPIRATION RATE: 18 BRPM | TEMPERATURE: 98 F

## 2020-01-28 PROCEDURE — 86900 BLOOD TYPING SEROLOGIC ABO: CPT

## 2020-01-28 PROCEDURE — 85018 HEMOGLOBIN: CPT

## 2020-01-28 PROCEDURE — G0463: CPT

## 2020-01-28 PROCEDURE — 59025 FETAL NON-STRESS TEST: CPT

## 2020-01-28 PROCEDURE — 86780 TREPONEMA PALLIDUM: CPT

## 2020-01-28 PROCEDURE — 86901 BLOOD TYPING SEROLOGIC RH(D): CPT

## 2020-01-28 PROCEDURE — 59050 FETAL MONITOR W/REPORT: CPT

## 2020-01-28 PROCEDURE — 85014 HEMATOCRIT: CPT

## 2020-01-28 PROCEDURE — 85027 COMPLETE CBC AUTOMATED: CPT

## 2020-01-28 PROCEDURE — 86850 RBC ANTIBODY SCREEN: CPT

## 2020-01-28 RX ORDER — IBUPROFEN 200 MG
1 TABLET ORAL
Qty: 0 | Refills: 0 | DISCHARGE
Start: 2020-01-28

## 2020-01-28 RX ORDER — ACETAMINOPHEN 500 MG
3 TABLET ORAL
Qty: 0 | Refills: 0 | DISCHARGE
Start: 2020-01-28

## 2020-01-28 RX ADMIN — Medication 975 MILLIGRAM(S): at 08:36

## 2020-01-28 RX ADMIN — Medication 975 MILLIGRAM(S): at 09:10

## 2020-01-28 RX ADMIN — Medication 600 MILLIGRAM(S): at 05:49

## 2020-01-28 RX ADMIN — Medication 1 TABLET(S): at 12:11

## 2020-01-28 RX ADMIN — Medication 975 MILLIGRAM(S): at 03:02

## 2020-01-28 RX ADMIN — SIMETHICONE 80 MILLIGRAM(S): 80 TABLET, CHEWABLE ORAL at 08:36

## 2020-01-28 RX ADMIN — SODIUM CHLORIDE 3 MILLILITER(S): 9 INJECTION INTRAMUSCULAR; INTRAVENOUS; SUBCUTANEOUS at 00:03

## 2020-01-28 RX ADMIN — Medication 600 MILLIGRAM(S): at 01:00

## 2020-01-28 RX ADMIN — Medication 975 MILLIGRAM(S): at 04:00

## 2020-01-28 RX ADMIN — Medication 600 MILLIGRAM(S): at 00:06

## 2020-01-28 RX ADMIN — Medication 600 MILLIGRAM(S): at 12:11

## 2020-01-28 RX ADMIN — Medication 600 MILLIGRAM(S): at 12:41

## 2020-01-28 NOTE — PROGRESS NOTE ADULT - SUBJECTIVE AND OBJECTIVE BOX
S: Patient is doing well without complaints. Tolerates regular diet. She states lochia is in WNL. Ambulating without difficulty. Denies N/V. Voiding freely. Passing flatus. Pain well controlled with oral pain medications. Denies any HA/vision changes, CP/SOB, F/C/S.    O: Vital Signs Last 24 Hrs  T(C): 36.8 (28 Jan 2020 05:51), Max: 36.8 (28 Jan 2020 05:51)  T(F): 98.2 (28 Jan 2020 05:51), Max: 98.2 (28 Jan 2020 05:51)  HR: 61 (28 Jan 2020 05:51) (61 - 65)  BP: 112/73 (28 Jan 2020 05:51) (101/74 - 112/73)  BP(mean): --  RR: 16 (28 Jan 2020 05:51) (16 - 18)  SpO2: 98% (28 Jan 2020 05:51) (98% - 99%)    Physical Exam:  General: NAD  Abdomen: soft, non-tender, non-distended, fundus firm  Vaginal: deferred  Ext: NTBL    Labs:             11.5   x     )-----------( x        ( 01-27 @ 14:06 )             36.2           MEDICATIONS  (STANDING):  acetaminophen   Tablet .. 975 milliGRAM(s) Oral <User Schedule>  diphtheria/tetanus/pertussis (acellular) Vaccine (ADAcel) 0.5 milliLiter(s) IntraMuscular once  ibuprofen  Tablet. 600 milliGRAM(s) Oral every 6 hours  ondansetron Injectable 4 milliGRAM(s) IV Push once  oxytocin Infusion 333.333 milliUNIT(s)/Min (1000 mL/Hr) IV Continuous <Continuous>  prenatal multivitamin 1 Tablet(s) Oral daily  sodium chloride 0.9% lock flush 3 milliLiter(s) IV Push every 8 hours

## 2020-01-28 NOTE — DISCHARGE NOTE OB - CAREGIVER RELATION TO PATIENT
spouse

## 2020-01-28 NOTE — DISCHARGE NOTE OB - MATERIALS PROVIDED
Guide to Postpartum Care/Birth Certificate Instructions/Discharge Medication Information for Patients and Families Pocket Guide/[facility] [place] Screening Program/Bottle Feeding Log/[facility] Hearing Screen Program/Shaken Baby Prevention Handout/  Immunization Record/Back To Sleep Handout

## 2020-01-28 NOTE — DISCHARGE NOTE OB - PATIENT PORTAL LINK FT
You can access the FollowMyHealth Patient Portal offered by [facility] by registering at the following website: [email or web address]. By joining [facility]’s FollowMyHealth portal, you will also be able to view your health information using other applications (apps) compatible with our system.

## 2020-01-28 NOTE — DISCHARGE NOTE OB - FUNCTIONAL SCREEN CURRENT LEVEL: DRESSING, MLM
[No] : patient does not have a  history of radiation therapy [de-identified] : 62 yo male\par 1-2 day h/o moderate vertigo and imbalance w/ assoc left sided hearing loss and tinnitus\par no prior h/o of same\par no trauma\par No recent URI or salty meal\par no other modifying factors\par no nasal or throat complaints\par  [Tinnitus] : tinnitus [Vertigo] : vertigo 0 = independent [Anxiety] : no anxiety [Hearing Loss] : hearing loss [Headache] : no headache [Neurologic Symptoms] : no associated neurologic symptoms [Orthostatic Hypotension] : no orthostatic hypotension [Otalgia] : no otalgia [Recurrent Otitis Media] : no recurrent otitis media [Otitis Media with Effusion] : no otitis media with effusion [Meniere Disease] : no Meniere disease [Eustachian Tube Dysfunction] : no eustachian tube dysfunction [Otosclerosis] : no otosclerosis [Autoimmune Diseases] : no autoimmune diseases [Perilymphatic Fistula] : no perilymphatic fistula [Cholesteatoma] : no cholesteatoma [Hypertension] : no hypertension [Loud Noise Exposure] : no history of loud noise exposure [Hypotension] : no hypotension [Smoking] : no smoking [Early Onset Hearing Loss] : no early onset hearing loss [Stroke] : no stroke [Facial Pain] : no facial pain [Facial Pressure] : no facial pressure [Nasal Congestion] : no nasal congestion [Diplopia] : no diplopia [Ear Fullness] : no ear fullness [Allergic Rhinitis] : no allergic rhinitis [Environmental Allergies] : no environmental allergies [Septal Deviation] : septal deviation [Seasonal Allergies] : no seasonal allergies [Environmental Allergens] : no environmental allergens [Adenoidectomy] : no adenoidectomy [Allergies] : no allergies [Asthma] : no asthma [Neck Mass] : no neck mass [Neck Pain] : no neck pain [Cold Intolerance] : no cold intolerance [Chills] : no chills [Cough] : no cough [Fatigue] : no fatigue [Hyperthyroidism] : no hyperthyroidism [Heat Intolerance] : no heat intolerance [Sialadenitis] : no sialadenitis [Hodgkin Disease] : no hodgkin disease [Non-Hodgkin Lymphoma] : no non-hodgkin lymphoma [Tobacco Use] : no tobacco use [Alcohol Use] : no alcohol use [Thyroid Cancer] : no thyroid cancer [Graves Disease] : no graves disease

## 2020-01-28 NOTE — DISCHARGE NOTE OB - MEDICATION SUMMARY - MEDICATIONS TO TAKE
I will START or STAY ON the medications listed below when I get home from the hospital:    acetaminophen 325 mg oral tablet  -- 3 tab(s) by mouth   -- Indication: For pain - take every 6 hours    ibuprofen 600 mg oral tablet  -- 1 tab(s) by mouth every 6 hours  -- Indication: For pain

## 2020-01-28 NOTE — DISCHARGE NOTE OB - CARE PROVIDER_API CALL
Miguel Ángel Kelsey)  Obstetrics and Gynecology  [address] [place] [address]  Phone: [phone]  Fax: [phone]  Follow Up Time:

## 2021-03-11 NOTE — PRE-ANESTHESIA EVALUATION ADULT - NSPROPOSEDPROCEDFT_GEN_ALL_CORE
Labor epidural Warfarin/Coumadin increases your risk for bleeding. Notify your doctor if you see any bleeding or any of the side effects listed in the Warfarin/Coumadin Booklet. Diet and medications can affect the PT/INR blood level. When Warfarin/Coumadin is taken with other medicines it can change the way other medicines work. Other medicines can also change the way Warfarin/Coumadin works. It is very important to tell your health care provider about all other medicines, including over-the-counter medications, herbs, diet supplements, or products containing vitamin K. Call your doctor before starting, stopping, or changing the dose of any prescription or over-the-counter medications. Any product containing aspirin lessens the blood's ability to form clots and adds to the effect of Warfarin/Coumadin. Never take aspirin without speaking with your health care provider.

## 2021-03-18 NOTE — ED ADULT NURSE NOTE - RN DISCHARGE SIGNATURE
Left message for pt to call [phone] to convey UDS results. If pt returns call, please  for alcohol in UDS.    ----- Message from [address] sent at 3/16/2021  8:21 AM CDT -----  UDS inappropriate for alcohol. She signed contract with pain clinic on the day of this test.  Please call and remind the patient that she can not drink any alcohol when receiving opioids from our clinic. 20-Jul-2018

## 2021-05-20 ENCOUNTER — TRANSCRIPTION ENCOUNTER (OUTPATIENT)
Age: 36
End: 2021-05-20

## 2021-05-20 ENCOUNTER — OUTPATIENT (OUTPATIENT)
Dept: OUTPATIENT SERVICES | Facility: HOSPITAL | Age: 36
LOS: 1 days | End: 2021-05-20
Payer: COMMERCIAL

## 2021-05-20 VITALS
OXYGEN SATURATION: 100 % | DIASTOLIC BLOOD PRESSURE: 63 MMHG | RESPIRATION RATE: 16 BRPM | SYSTOLIC BLOOD PRESSURE: 100 MMHG | WEIGHT: 126.99 LBS | HEIGHT: 66 IN | TEMPERATURE: 99 F

## 2021-05-20 DIAGNOSIS — Z11.52 ENCOUNTER FOR SCREENING FOR COVID-19: ICD-10-CM

## 2021-05-20 DIAGNOSIS — O03.9 COMPLETE OR UNSPECIFIED SPONTANEOUS ABORTION WITHOUT COMPLICATION: ICD-10-CM

## 2021-05-20 DIAGNOSIS — O02.1 MISSED ABORTION: ICD-10-CM

## 2021-05-20 DIAGNOSIS — Z98.890 OTHER SPECIFIED POSTPROCEDURAL STATES: Chronic | ICD-10-CM

## 2021-05-20 LAB
ANION GAP SERPL CALC-SCNC: 15 MMOL/L — SIGNIFICANT CHANGE UP (ref 5–17)
BLD GP AB SCN SERPL QL: NEGATIVE — SIGNIFICANT CHANGE UP
BUN SERPL-MCNC: 10 MG/DL — SIGNIFICANT CHANGE UP (ref 7–23)
CALCIUM SERPL-MCNC: 9.6 MG/DL — SIGNIFICANT CHANGE UP (ref 8.4–10.5)
CHLORIDE SERPL-SCNC: 100 MMOL/L — SIGNIFICANT CHANGE UP (ref 96–108)
CO2 SERPL-SCNC: 21 MMOL/L — LOW (ref 22–31)
CREAT SERPL-MCNC: 0.65 MG/DL — SIGNIFICANT CHANGE UP (ref 0.5–1.3)
GLUCOSE SERPL-MCNC: 74 MG/DL — SIGNIFICANT CHANGE UP (ref 70–99)
HCT VFR BLD CALC: 39.1 % — SIGNIFICANT CHANGE UP (ref 34.5–45)
HGB BLD-MCNC: 12.6 G/DL — SIGNIFICANT CHANGE UP (ref 11.5–15.5)
MCHC RBC-ENTMCNC: 28.8 PG — SIGNIFICANT CHANGE UP (ref 27–34)
MCHC RBC-ENTMCNC: 32.2 GM/DL — SIGNIFICANT CHANGE UP (ref 32–36)
MCV RBC AUTO: 89.3 FL — SIGNIFICANT CHANGE UP (ref 80–100)
NRBC # BLD: 0 /100 WBCS — SIGNIFICANT CHANGE UP (ref 0–0)
PLATELET # BLD AUTO: 382 K/UL — SIGNIFICANT CHANGE UP (ref 150–400)
POTASSIUM SERPL-MCNC: 4.2 MMOL/L — SIGNIFICANT CHANGE UP (ref 3.5–5.3)
POTASSIUM SERPL-SCNC: 4.2 MMOL/L — SIGNIFICANT CHANGE UP (ref 3.5–5.3)
RBC # BLD: 4.38 M/UL — SIGNIFICANT CHANGE UP (ref 3.8–5.2)
RBC # FLD: 12.2 % — SIGNIFICANT CHANGE UP (ref 10.3–14.5)
RH IG SCN BLD-IMP: POSITIVE — SIGNIFICANT CHANGE UP
SARS-COV-2 RNA SPEC QL NAA+PROBE: SIGNIFICANT CHANGE UP
SODIUM SERPL-SCNC: 136 MMOL/L — SIGNIFICANT CHANGE UP (ref 135–145)
WBC # BLD: 10.35 K/UL — SIGNIFICANT CHANGE UP (ref 3.8–10.5)
WBC # FLD AUTO: 10.35 K/UL — SIGNIFICANT CHANGE UP (ref 3.8–10.5)

## 2021-05-20 PROCEDURE — 86900 BLOOD TYPING SEROLOGIC ABO: CPT

## 2021-05-20 PROCEDURE — 86901 BLOOD TYPING SEROLOGIC RH(D): CPT

## 2021-05-20 PROCEDURE — 85027 COMPLETE CBC AUTOMATED: CPT

## 2021-05-20 PROCEDURE — G0463: CPT

## 2021-05-20 PROCEDURE — 80048 BASIC METABOLIC PNL TOTAL CA: CPT

## 2021-05-20 PROCEDURE — 86850 RBC ANTIBODY SCREEN: CPT

## 2021-05-20 PROCEDURE — C9803: CPT

## 2021-05-20 PROCEDURE — U0005: CPT

## 2021-05-20 PROCEDURE — U0003: CPT

## 2021-05-20 RX ORDER — SODIUM CHLORIDE 9 MG/ML
3 INJECTION INTRAMUSCULAR; INTRAVENOUS; SUBCUTANEOUS EVERY 8 HOURS
Refills: 0 | Status: DISCONTINUED | OUTPATIENT
Start: 2021-05-21 | End: 2021-06-04

## 2021-05-20 RX ORDER — LIDOCAINE HCL 20 MG/ML
0.2 VIAL (ML) INJECTION ONCE
Refills: 0 | Status: DISCONTINUED | OUTPATIENT
Start: 2021-05-21 | End: 2021-06-04

## 2021-05-20 NOTE — H&P PST ADULT - ATTENDING COMMENTS
35 yo with known missed ab at 8-9 weeks, admitted for suction D&C---rbas reviewed--- KARELY Kelsey MD

## 2021-05-20 NOTE — H&P PST ADULT - NSICDXPASTSURGICALHX_GEN_ALL_CORE_FT
PAST SURGICAL HISTORY:  No significant past surgical history      PAST SURGICAL HISTORY:  History of dilation and curettage miscarriage 2018

## 2021-05-20 NOTE — H&P PST ADULT - NSICDXPASTMEDICALHX_GEN_ALL_CORE_FT
PAST MEDICAL HISTORY:  PCOS (polycystic ovarian syndrome)      PAST MEDICAL HISTORY:  PCOS (polycystic ovarian syndrome)     Subchorionic hematoma 2020 during pregnancy     PAST MEDICAL HISTORY:   (normal spontaneous vaginal delivery) , 2016,     PCOS (polycystic ovarian syndrome)     Subchorionic hematoma  during pregnancy

## 2021-05-20 NOTE — H&P PST ADULT - HISTORY OF PRESENT ILLNESS
36 year old female, approximately 9 weeks gestation, reports routine OB visit today revealed no fetal heartbeat. Pt. denies experiencing vaginal bleeding and denies cramping.    Pt. denies known recent COVID exposure. 36 year old female, approximately 9 weeks gestation, reports routine OB visit today revealed no fetal heartbeat. Pt. denies experiencing vaginal bleeding and denies cramping.    Pt. denies known recent COVID exposure, s/p Moderna Vaccine X 2 doses.

## 2021-05-21 ENCOUNTER — OUTPATIENT (OUTPATIENT)
Dept: OUTPATIENT SERVICES | Facility: HOSPITAL | Age: 36
LOS: 1 days | End: 2021-05-21
Payer: COMMERCIAL

## 2021-05-21 ENCOUNTER — RESULT REVIEW (OUTPATIENT)
Age: 36
End: 2021-05-21

## 2021-05-21 VITALS
HEART RATE: 59 BPM | SYSTOLIC BLOOD PRESSURE: 99 MMHG | RESPIRATION RATE: 14 BRPM | DIASTOLIC BLOOD PRESSURE: 51 MMHG | TEMPERATURE: 97 F | OXYGEN SATURATION: 97 %

## 2021-05-21 VITALS
DIASTOLIC BLOOD PRESSURE: 44 MMHG | HEIGHT: 66 IN | WEIGHT: 126.99 LBS | OXYGEN SATURATION: 100 % | TEMPERATURE: 97 F | HEART RATE: 58 BPM | RESPIRATION RATE: 16 BRPM | SYSTOLIC BLOOD PRESSURE: 86 MMHG

## 2021-05-21 DIAGNOSIS — O02.1 MISSED ABORTION: ICD-10-CM

## 2021-05-21 DIAGNOSIS — Z98.890 OTHER SPECIFIED POSTPROCEDURAL STATES: Chronic | ICD-10-CM

## 2021-05-21 PROCEDURE — 86850 RBC ANTIBODY SCREEN: CPT

## 2021-05-21 PROCEDURE — 88264 CHROMOSOME ANALYSIS 20-25: CPT

## 2021-05-21 PROCEDURE — 88233 TISSUE CULTURE SKIN/BIOPSY: CPT

## 2021-05-21 PROCEDURE — 88280 CHROMOSOME KARYOTYPE STUDY: CPT

## 2021-05-21 PROCEDURE — 59820 CARE OF MISCARRIAGE: CPT

## 2021-05-21 PROCEDURE — 88305 TISSUE EXAM BY PATHOLOGIST: CPT

## 2021-05-21 PROCEDURE — 86901 BLOOD TYPING SEROLOGIC RH(D): CPT

## 2021-05-21 PROCEDURE — 88305 TISSUE EXAM BY PATHOLOGIST: CPT | Mod: 26

## 2021-05-21 PROCEDURE — 86900 BLOOD TYPING SEROLOGIC ABO: CPT

## 2021-05-21 NOTE — ASU PATIENT PROFILE, ADULT - PMH
(normal spontaneous vaginal delivery)  2014, 2016, 2020  PCOS (polycystic ovarian syndrome)    Subchorionic hematoma  2020 during pregnancy

## 2021-05-21 NOTE — ASU DISCHARGE PLAN (ADULT/PEDIATRIC) - NURSING INSTRUCTIONS
******************************************************************************************  Next dose of TYLENOL may be taken at or after _______4:45______ PM if needed. DO NOT take any additional products containing TYLENOL or ACETAMINOPHEN, such as VICODIN, PERCOCET, NORCO, EXCEDRIN, and any over-the-counter cold medications until this time. DO NOT CONSUME MORE THAN 6442-1159 MG of TYLENOL (acetaminophen) in a 24-hour period.   ******************************************************************************************  Next dose of NSAIDs (ibuprofen, motrin, advil, naproxen, or aleve) may be taken at or after _______5:00______ PM if prescribed by your surgeon.   ******************************************************************************************

## 2021-05-21 NOTE — ASU DISCHARGE PLAN (ADULT/PEDIATRIC) - CARE PROVIDER_API CALL
Miguel Ángel Kelsey)  Obstetrics and Gynecology  [address] [place] [address], [address]  [place] [address]  Phone: [phone]  Fax: [phone]  Follow Up Time:

## 2021-05-21 NOTE — BRIEF OPERATIVE NOTE - NSICDXBRIEFPROCEDURE_GEN_ALL_CORE_FT
PROCEDURES:  Dilation and curettage, uterus, using suction, for missed first trimester  21-May-2021 11:11:30  Miguel Ángel Kelsey

## 2021-05-26 LAB — SURGICAL PATHOLOGY STUDY: SIGNIFICANT CHANGE UP

## 2021-06-15 LAB — CHROM ANALY OVERALL INTERP SPEC-IMP: SIGNIFICANT CHANGE UP

## 2021-06-29 PROBLEM — O41.8X90 OTHER SPECIFIED DISORDERS OF AMNIOTIC FLUID AND MEMBRANES, UNSPECIFIED TRIMESTER, NOT APPLICABLE OR UNSPECIFIED: Chronic | Status: ACTIVE | Noted: 2021-05-20

## 2021-06-30 ENCOUNTER — NON-APPOINTMENT (OUTPATIENT)
Age: 36
End: 2021-06-30

## 2021-06-30 ENCOUNTER — TRANSCRIPTION ENCOUNTER (OUTPATIENT)
Age: 36
End: 2021-06-30

## 2021-06-30 ENCOUNTER — APPOINTMENT (OUTPATIENT)
Dept: INTERNAL MEDICINE | Facility: CLINIC | Age: 36
End: 2021-06-30
Payer: COMMERCIAL

## 2021-06-30 VITALS
TEMPERATURE: 98.1 F | BODY MASS INDEX: 20.73 KG/M2 | DIASTOLIC BLOOD PRESSURE: 65 MMHG | OXYGEN SATURATION: 96 % | SYSTOLIC BLOOD PRESSURE: 96 MMHG | HEART RATE: 46 BPM | WEIGHT: 129 LBS | HEIGHT: 66 IN

## 2021-06-30 VITALS — DIASTOLIC BLOOD PRESSURE: 60 MMHG | SYSTOLIC BLOOD PRESSURE: 90 MMHG

## 2021-06-30 DIAGNOSIS — Z83.438 FAMILY HISTORY OF OTHER DISORDER OF LIPOPROTEIN METABOLISM AND OTHER LIPIDEMIA: ICD-10-CM

## 2021-06-30 DIAGNOSIS — O46.90 ANTEPARTUM HEMORRHAGE, UNSPECIFIED, UNSPECIFIED TRIMESTER: ICD-10-CM

## 2021-06-30 DIAGNOSIS — E55.9 VITAMIN D DEFICIENCY, UNSPECIFIED: ICD-10-CM

## 2021-06-30 DIAGNOSIS — Z78.9 OTHER SPECIFIED HEALTH STATUS: ICD-10-CM

## 2021-06-30 DIAGNOSIS — I73.00 RAYNAUD'S SYNDROME W/OUT GANGRENE: ICD-10-CM

## 2021-06-30 DIAGNOSIS — Z80.0 FAMILY HISTORY OF MALIGNANT NEOPLASM OF DIGESTIVE ORGANS: ICD-10-CM

## 2021-06-30 PROCEDURE — 99204 OFFICE O/P NEW MOD 45 MIN: CPT | Mod: 25

## 2021-06-30 PROCEDURE — 99072 ADDL SUPL MATRL&STAF TM PHE: CPT

## 2021-06-30 PROCEDURE — 99385 PREV VISIT NEW AGE 18-39: CPT

## 2021-06-30 RX ORDER — CETIRIZINE HYDROCHLORIDE AND PSEUDOEPHEDRINE HYDROCHLORIDE 5; 120 MG/1; MG/1
5-120 TABLET, FILM COATED, EXTENDED RELEASE ORAL TWICE DAILY
Refills: 0 | Status: ACTIVE | COMMUNITY
Start: 2021-06-30

## 2021-06-30 RX ORDER — FLUTICASONE PROPIONATE 50 UG/1
50 SPRAY, METERED NASAL
Qty: 1 | Refills: 0 | Status: ACTIVE | COMMUNITY
Start: 2021-06-30

## 2021-06-30 RX ORDER — PNV 119/IRON FUM/FOLIC ACID 29 MG-1 MG
TABLET ORAL DAILY
Refills: 0 | Status: ACTIVE | COMMUNITY
Start: 2021-06-30

## 2021-06-30 RX ORDER — METFORMIN ER 750 MG 750 MG/1
750 TABLET ORAL
Qty: 180 | Refills: 1 | Status: ACTIVE | COMMUNITY
Start: 2021-06-30

## 2021-06-30 NOTE — REVIEW OF SYSTEMS
[Constipation] : constipation [Negative] : Heme/Lymph [Fever] : no fever [Chills] : no chills [Fatigue] : no fatigue [Recent Change In Weight] : ~T no recent weight change [Chest Pain] : no chest pain [Palpitations] : no palpitations [Lower Ext Edema] : no lower extremity edema [Shortness Of Breath] : no shortness of breath [Wheezing] : no wheezing [Cough] : no cough [Dyspnea on Exertion] : no dyspnea on exertion [Abdominal Pain] : no abdominal pain [Nausea] : no nausea [Diarrhea] : diarrhea [Vomiting] : no vomiting [Heartburn] : no heartburn [Melena] : no melena [Dysuria] : no dysuria [Incontinence] : no incontinence [Nocturia] : no nocturia [Hematuria] : no hematuria [Joint Pain] : no joint pain [Joint Stiffness] : no joint stiffness [Joint Swelling] : no joint swelling [Muscle Pain] : no muscle pain [Back Pain] : no back pain [Itching] : no itching [Mole Changes] : no mole changes [Skin Rash] : no skin rash [Headache] : no headache [Dizziness] : no dizziness [Fainting] : no fainting [Unsteady Walking] : no ataxia [Insomnia] : no insomnia [Anxiety] : no anxiety [Depression] : no depression [Easy Bleeding] : no easy bleeding [Easy Bruising] : no easy bruising [Swollen Glands] : no swollen glands [FreeTextEntry3] : wears corrective lens. [FreeTextEntry7] : She has 3 BM per week.

## 2021-06-30 NOTE — PHYSICAL EXAM
[No Acute Distress] : no acute distress [Well Nourished] : well nourished [Well Developed] : well developed [Normal Sclera/Conjunctiva] : normal sclera/conjunctiva [PERRL] : pupils equal round and reactive to light [EOMI] : extraocular movements intact [Normal Outer Ear/Nose] : the outer ears and nose were normal in appearance [Normal Oropharynx] : the oropharynx was normal [Normal TMs] : both tympanic membranes were normal [No JVD] : no jugular venous distention [Normal Nasal Mucosa] : the nasal mucosa was normal [No Lymphadenopathy] : no lymphadenopathy [Supple] : supple [No Respiratory Distress] : no respiratory distress  [Clear to Auscultation] : lungs were clear to auscultation bilaterally [Normal Rate] : normal rate  [Regular Rhythm] : with a regular rhythm [Normal S1, S2] : normal S1 and S2 [No Murmur] : no murmur heard [No Carotid Bruits] : no carotid bruits [Pedal Pulses Present] : the pedal pulses are present [No Edema] : there was no peripheral edema [No Extremity Clubbing/Cyanosis] : no extremity clubbing/cyanosis [Normal Appearance] : normal in appearance [No Nipple Discharge] : no nipple discharge [No Axillary Lymphadenopathy] : no axillary lymphadenopathy [Soft] : abdomen soft [Non Tender] : non-tender [Non-distended] : non-distended [No Masses] : no abdominal mass palpated [No HSM] : no HSM [Normal Bowel Sounds] : normal bowel sounds [Normal Supraclavicular Nodes] : no supraclavicular lymphadenopathy [Normal Axillary Nodes] : no axillary lymphadenopathy [Normal Posterior Cervical Nodes] : no posterior cervical lymphadenopathy [Normal Anterior Cervical Nodes] : no anterior cervical lymphadenopathy [No CVA Tenderness] : no CVA  tenderness [No Spinal Tenderness] : no spinal tenderness [No Joint Swelling] : no joint swelling [Grossly Normal Strength/Tone] : grossly normal strength/tone [No Rash] : no rash [Coordination Grossly Intact] : coordination grossly intact [No Focal Deficits] : no focal deficits [Normal Gait] : normal gait [Speech Grossly Normal] : speech grossly normal [Normal Affect] : the affect was normal [Alert and Oriented x3] : oriented to person, place, and time [Normal Mood] : the mood was normal [de-identified] : female in stated age,

## 2021-06-30 NOTE — HISTORY OF PRESENT ILLNESS
[FreeTextEntry1] : Patient presented for the first time for transition of care, she's looking for a new PCP and requesting a PE.  Last PE was about 5 years ago. [de-identified] : She feels well and has no new complaint.\par \par Pt was well and did not get sick throughout the COVID pandemic.  She had COVID vaccine and tolerated it well.

## 2021-06-30 NOTE — HEALTH RISK ASSESSMENT
[Very Good] : ~his/her~ current health as very good [Good] : ~his/her~  mood as  good [Yes] : Yes [2 - 3 times a week (3 pts)] : 2 - 3  times a week (3 points) [1 or 2 (0 pts)] : 1 or 2 (0 points) [Never (0 pts)] : Never (0 points) [No] : In the past 12 months have you used drugs other than those required for medical reasons? No [No falls in past year] : Patient reported no falls in the past year [0] : 2) Feeling down, depressed, or hopeless: Not at all (0) [None] : None [With Family] : lives with family [# of Members in Household ___] :  household currently consist of [unfilled] member(s) [Employed] : employed [Graduate School] : graduate school [] :  [# Of Children ___] : has [unfilled] children [Feels Safe at Home] : Feels safe at home [Fully functional (bathing, dressing, toileting, transferring, walking, feeding)] : Fully functional (bathing, dressing, toileting, transferring, walking, feeding) [Fully functional (using the telephone, shopping, preparing meals, housekeeping, doing laundry, using] : Fully functional and needs no help or supervision to perform IADLs (using the telephone, shopping, preparing meals, housekeeping, doing laundry, using transportation, managing medications and managing finances) [Smoke Detector] : smoke detector [Carbon Monoxide Detector] : carbon monoxide detector [Seat Belt] :  uses seat belt [] : No [Audit-CScore] : 3 [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

## 2021-06-30 NOTE — ASSESSMENT
[FreeTextEntry1] : Patient is up-to-date with colonoscopy.  She was reminded to have routine eye exam and dental care.  I also gave a prescription to check routine labs.

## 2021-06-30 NOTE — PAST MEDICAL HISTORY
[Menstruating] : menstruating [Menarche Age ____] : age at menarche was [unfilled] [Approximately ___] : the LMP was approximately [unfilled] [Normal Amount/Duration] : it was of a normal amount and duration [Irregular Cycle Intervals] : are  irregular [Total Preg ___] : G[unfilled] [Live Births ___] : P[unfilled]  [AB Spont ___] : miscarriages: [unfilled]  [FreeTextEntry1] : Irregular due to PCOS,

## 2021-09-29 LAB
25(OH)D3 SERPL-MCNC: 71.3 NG/ML
ALBUMIN SERPL ELPH-MCNC: 4.1 G/DL
ALP BLD-CCNC: 40 U/L
ALT SERPL-CCNC: 7 U/L
ANION GAP SERPL CALC-SCNC: 10 MMOL/L
APPEARANCE: CLEAR
AST SERPL-CCNC: 13 U/L
BASOPHILS # BLD AUTO: 0.09 K/UL
BASOPHILS NFR BLD AUTO: 1.3 %
BILIRUB SERPL-MCNC: 0.4 MG/DL
BILIRUBIN URINE: NEGATIVE
BLOOD URINE: NEGATIVE
BUN SERPL-MCNC: 9 MG/DL
CALCIUM SERPL-MCNC: 9.1 MG/DL
CHLORIDE SERPL-SCNC: 100 MMOL/L
CHOLEST SERPL-MCNC: 192 MG/DL
CO2 SERPL-SCNC: 25 MMOL/L
COLOR: NORMAL
CREAT SERPL-MCNC: 0.72 MG/DL
EOSINOPHIL # BLD AUTO: 0.31 K/UL
EOSINOPHIL NFR BLD AUTO: 4.6 %
ESTIMATED AVERAGE GLUCOSE: 100 MG/DL
GLUCOSE QUALITATIVE U: NEGATIVE
GLUCOSE SERPL-MCNC: 86 MG/DL
HBA1C MFR BLD HPLC: 5.1 %
HCT VFR BLD CALC: 42 %
HDLC SERPL-MCNC: 59 MG/DL
HGB BLD-MCNC: 13.3 G/DL
IMM GRANULOCYTES NFR BLD AUTO: 0.1 %
KETONES URINE: NEGATIVE
LDLC SERPL CALC-MCNC: 107 MG/DL
LEUKOCYTE ESTERASE URINE: NEGATIVE
LYMPHOCYTES # BLD AUTO: 2 K/UL
LYMPHOCYTES NFR BLD AUTO: 29.5 %
MAN DIFF?: NORMAL
MCHC RBC-ENTMCNC: 28.7 PG
MCHC RBC-ENTMCNC: 31.7 GM/DL
MCV RBC AUTO: 90.7 FL
MONOCYTES # BLD AUTO: 0.51 K/UL
MONOCYTES NFR BLD AUTO: 7.5 %
NEUTROPHILS # BLD AUTO: 3.86 K/UL
NEUTROPHILS NFR BLD AUTO: 57 %
NITRITE URINE: NEGATIVE
NONHDLC SERPL-MCNC: 132 MG/DL
PH URINE: 7
PLATELET # BLD AUTO: 346 K/UL
POTASSIUM SERPL-SCNC: 4.2 MMOL/L
PROT SERPL-MCNC: 6 G/DL
PROTEIN URINE: NEGATIVE
RBC # BLD: 4.63 M/UL
RBC # FLD: 11.7 %
SODIUM SERPL-SCNC: 135 MMOL/L
SPECIFIC GRAVITY URINE: 1.01
T4 FREE SERPL-MCNC: 1.1 NG/DL
TRIGL SERPL-MCNC: 129 MG/DL
TSH SERPL-ACNC: 1.02 UIU/ML
UROBILINOGEN URINE: NORMAL
WBC # FLD AUTO: 6.78 K/UL

## 2021-09-30 ENCOUNTER — NON-APPOINTMENT (OUTPATIENT)
Age: 36
End: 2021-09-30

## 2021-11-30 NOTE — PRE-ANESTHESIA EVALUATION ADULT - NSANTHOSAYNRD_GEN_A_CORE
No. KAROLYN screening performed.  STOP BANG Legend: 0-2 = LOW Risk; 3-4 = INTERMEDIATE Risk; 5-8 = HIGH Risk Consent: The patient's consent was obtained including but not limited to risks of crusting, scabbing, blistering, scarring, darker or lighter pigmentary change, recurrence, incomplete removal and infection. Detail Level: Detailed Add 52 Modifier (Optional): no Medical Necessity Information: It is in your best interest to select a reason for this procedure from the list below. All of these items fulfill various CMS LCD requirements except the new and changing color options. Medical Necessity Clause: This procedure was medically necessary because the lesions that were treated were: Show Applicator Variable?: Yes Pared With?: 15 blade Post-Care Instructions: I reviewed with the patient in detail post-care instructions. Patient is to wear sunprotection, and avoid picking at any of the treated lesions. Pt may apply Vaseline to crusted or scabbing areas.

## 2022-02-25 ENCOUNTER — APPOINTMENT (OUTPATIENT)
Dept: HUMAN REPRODUCTION | Facility: CLINIC | Age: 37
End: 2022-02-25
Payer: COMMERCIAL

## 2022-02-25 PROCEDURE — 99205 OFFICE O/P NEW HI 60 MIN: CPT | Mod: 25

## 2022-02-25 PROCEDURE — 36415 COLL VENOUS BLD VENIPUNCTURE: CPT

## 2022-02-25 PROCEDURE — 76830 TRANSVAGINAL US NON-OB: CPT

## 2022-07-13 NOTE — PATIENT PROFILE OB - FALL HARM RISK CONCLUSION
Universal Safety Interventions
Assistance OOB with selected safe patient handling equipment/Communicate Risk of Fall with Harm to all staff/Discuss with provider need for PT consult/Monitor gait and stability/Provide patient with walking aids - walker, cane, crutches/Reinforce activity limits and safety measures with patient and family/Tailored Fall Risk Interventions/Visual Cue: Yellow wristband and red socks/Bed in lowest position, wheels locked, appropriate side rails in place/Call bell, personal items and telephone in reach/Instruct patient to call for assistance before getting out of bed or chair/Non-slip footwear when patient is out of bed/[place] to call system/Physically safe environment - no spills, clutter or unnecessary equipment/Purposeful Proactive Rounding/Room/bathroom lighting operational, light cord in reach

## 2022-07-17 NOTE — DISCHARGE NOTE OB - DO YOU HAVE DIFFICULTY CLIMBING STAIRS
MEDICATIONS  (STANDING):  aMIOdarone    Tablet 200 milliGRAM(s) Oral daily  amLODIPine   Tablet 10 milliGRAM(s) Oral daily  atorvastatin 10 milliGRAM(s) Oral at bedtime  carvedilol 25 milliGRAM(s) Oral every 12 hours  cholecalciferol 2000 Unit(s) Oral daily  cyanocobalamin 1000 MICROGram(s) Oral daily  famotidine    Tablet 20 milliGRAM(s) Oral daily  fludroCORTISONE 0.1 milliGRAM(s) Oral daily  hydrALAZINE 100 milliGRAM(s) Oral every 8 hours  hydrocortisone 10 milliGRAM(s) Oral <User Schedule>  hydrocortisone 5 milliGRAM(s) Oral <User Schedule>  potassium chloride   Powder 20 milliEquivalent(s) Oral every 4 hours  senna 2 Tablet(s) Oral at bedtime    MEDICATIONS  (PRN):  acetaminophen     Tablet .. 650 milliGRAM(s) Oral every 6 hours PRN Temp greater or equal to 38C (100.4F), Mild Pain (1 - 3)  aluminum hydroxide/magnesium hydroxide/simethicone Suspension 30 milliLiter(s) Oral every 4 hours PRN Dyspepsia  bismuth subsalicylate Liquid 15 milliLiter(s) Oral four times a day PRN Dyspsea/GI DIscomfort  loratadine 10 milliGRAM(s) Oral daily PRN Allergies  melatonin 3 milliGRAM(s) Oral at bedtime PRN Insomnia  ondansetron Injectable 4 milliGRAM(s) IV Push every 8 hours PRN Nausea and/or Vomiting  polyethylene glycol 3350 17 Gram(s) Oral daily PRN Constipation      ASSESSMENT/PLAN    Acute on Chronic Diastolic Heart Failure Exacerbation   Moderate TR/MR; Mild AI  Bilateral Pleural Effusions  Anasarca  Severe Protein-Calorie Malnutrition with Associated Hypoalbuminemia  CKD III  -Place in observation with telemetry  -No clear hypoxia on admission.  -Troponin negative  -IV Lasix  -BB/ACEI  -Cardiology consulted  -TTE with normal EF, DD, Moderate TR/MR; Mild AR  -Some hypokalemia on admission. Continue to monitor Cr and electrolytes and correct electrolytes accordingly.     Paroxysmal Atrial Fibrillation (no AC because of fall history)  -Continue BB/Amiodarone    Adrenal Insufficiency  -Home medications    HTN  -Home meds    MOLST from prior   No

## 2023-01-04 ENCOUNTER — LABORATORY RESULT (OUTPATIENT)
Age: 38
End: 2023-01-04

## 2023-01-04 ENCOUNTER — APPOINTMENT (OUTPATIENT)
Dept: INTERNAL MEDICINE | Facility: CLINIC | Age: 38
End: 2023-01-04
Payer: COMMERCIAL

## 2023-01-04 VITALS
BODY MASS INDEX: 20.89 KG/M2 | DIASTOLIC BLOOD PRESSURE: 70 MMHG | HEIGHT: 66 IN | OXYGEN SATURATION: 98 % | SYSTOLIC BLOOD PRESSURE: 108 MMHG | HEART RATE: 64 BPM | TEMPERATURE: 97.8 F | WEIGHT: 130 LBS

## 2023-01-04 VITALS — SYSTOLIC BLOOD PRESSURE: 100 MMHG | DIASTOLIC BLOOD PRESSURE: 74 MMHG

## 2023-01-04 DIAGNOSIS — E28.2 POLYCYSTIC OVARIAN SYNDROME: ICD-10-CM

## 2023-01-04 DIAGNOSIS — Z00.00 ENCOUNTER FOR GENERAL ADULT MEDICAL EXAMINATION W/OUT ABNORMAL FINDINGS: ICD-10-CM

## 2023-01-04 DIAGNOSIS — Z87.09 PERSONAL HISTORY OF OTHER DISEASES OF THE RESPIRATORY SYSTEM: ICD-10-CM

## 2023-01-04 DIAGNOSIS — K58.9 IRRITABLE BOWEL SYNDROME W/OUT DIARRHEA: ICD-10-CM

## 2023-01-04 DIAGNOSIS — G56.02 CARPAL TUNNEL SYNDROME, LEFT UPPER LIMB: ICD-10-CM

## 2023-01-04 DIAGNOSIS — U07.1 COVID-19: ICD-10-CM

## 2023-01-04 DIAGNOSIS — Z80.3 FAMILY HISTORY OF MALIGNANT NEOPLASM OF BREAST: ICD-10-CM

## 2023-01-04 DIAGNOSIS — Z12.39 ENCOUNTER FOR OTHER SCREENING FOR MALIGNANT NEOPLASM OF BREAST: ICD-10-CM

## 2023-01-04 DIAGNOSIS — J30.9 ALLERGIC RHINITIS, UNSPECIFIED: ICD-10-CM

## 2023-01-04 DIAGNOSIS — Z23 ENCOUNTER FOR IMMUNIZATION: ICD-10-CM

## 2023-01-04 DIAGNOSIS — Z80.8 FAMILY HISTORY OF MALIGNANT NEOPLASM OF OTHER ORGANS OR SYSTEMS: ICD-10-CM

## 2023-01-04 DIAGNOSIS — Z78.9 OTHER SPECIFIED HEALTH STATUS: ICD-10-CM

## 2023-01-04 PROCEDURE — 99395 PREV VISIT EST AGE 18-39: CPT

## 2023-01-04 RX ORDER — NIRMATRELVIR AND RITONAVIR 300-100 MG
20 X 150 MG & KIT ORAL
Qty: 5 | Refills: 0 | Status: COMPLETED | COMMUNITY
Start: 2022-05-06 | End: 2023-01-04

## 2023-01-04 RX ORDER — L.ACID,FERM,PLA,RHA/B.BIF,LONG 126 MG
TABLET, DELAYED AND EXTENDED RELEASE ORAL
Refills: 0 | Status: ACTIVE | COMMUNITY
Start: 2023-01-04

## 2023-01-04 NOTE — REVIEW OF SYSTEMS
[Constipation] : constipation [Headache] : headache [Negative] : Heme/Lymph [Fever] : no fever [Chills] : no chills [Fatigue] : no fatigue [Recent Change In Weight] : ~T no recent weight change [Chest Pain] : no chest pain [Palpitations] : no palpitations [Lower Ext Edema] : no lower extremity edema [Shortness Of Breath] : no shortness of breath [Wheezing] : no wheezing [Cough] : no cough [Dyspnea on Exertion] : no dyspnea on exertion [Abdominal Pain] : no abdominal pain [Nausea] : no nausea [Diarrhea] : diarrhea [Vomiting] : no vomiting [Heartburn] : no heartburn [Melena] : no melena [Dysuria] : no dysuria [Incontinence] : no incontinence [Nocturia] : no nocturia [Hematuria] : no hematuria [Joint Pain] : no joint pain [Joint Stiffness] : no joint stiffness [Joint Swelling] : no joint swelling [Muscle Pain] : no muscle pain [Back Pain] : no back pain [Itching] : no itching [Mole Changes] : no mole changes [Skin Rash] : no skin rash [Dizziness] : no dizziness [Fainting] : no fainting [Unsteady Walking] : no ataxia [Insomnia] : no insomnia [Anxiety] : no anxiety [Depression] : no depression [Easy Bleeding] : no easy bleeding [Easy Bruising] : no easy bruising [Swollen Glands] : no swollen glands [FreeTextEntry3] : wears corrective lens. [FreeTextEntry7] : She has 3 BM per week. [de-identified] : Gets HA from stress 2x per week, and occ takes Advil for HA with relief,

## 2023-01-04 NOTE — PAST MEDICAL HISTORY
[Menstruating] : menstruating [Menarche Age ____] : age at menarche was [unfilled] [Definite ___ (Date)] : the last menstrual period was [unfilled] [Normal Amount/Duration] : it was of a normal amount and duration [Regular Cycle Intervals] : have been regular [Total Preg ___] : G[unfilled] [Live Births ___] : P[unfilled]  [AB Spont ___] : miscarriages: [unfilled]  [FreeTextEntry1] : Irregular due to PCOS,

## 2023-01-04 NOTE — PHYSICAL EXAM
[No Acute Distress] : no acute distress [Well Nourished] : well nourished [Well Developed] : well developed [Normal Sclera/Conjunctiva] : normal sclera/conjunctiva [PERRL] : pupils equal round and reactive to light [EOMI] : extraocular movements intact [Normal Outer Ear/Nose] : the outer ears and nose were normal in appearance [Normal Oropharynx] : the oropharynx was normal [Normal TMs] : both tympanic membranes were normal [Normal Nasal Mucosa] : the nasal mucosa was normal [No JVD] : no jugular venous distention [No Lymphadenopathy] : no lymphadenopathy [Supple] : supple [No Respiratory Distress] : no respiratory distress  [Clear to Auscultation] : lungs were clear to auscultation bilaterally [Normal Rate] : normal rate  [Regular Rhythm] : with a regular rhythm [Normal S1, S2] : normal S1 and S2 [No Murmur] : no murmur heard [No Carotid Bruits] : no carotid bruits [Pedal Pulses Present] : the pedal pulses are present [No Edema] : there was no peripheral edema [No Extremity Clubbing/Cyanosis] : no extremity clubbing/cyanosis [Normal Appearance] : normal in appearance [No Nipple Discharge] : no nipple discharge [No Axillary Lymphadenopathy] : no axillary lymphadenopathy [Soft] : abdomen soft [Non Tender] : non-tender [Non-distended] : non-distended [No Masses] : no abdominal mass palpated [No HSM] : no HSM [Normal Bowel Sounds] : normal bowel sounds [Normal Supraclavicular Nodes] : no supraclavicular lymphadenopathy [Normal Axillary Nodes] : no axillary lymphadenopathy [Normal Posterior Cervical Nodes] : no posterior cervical lymphadenopathy [Normal Anterior Cervical Nodes] : no anterior cervical lymphadenopathy [No CVA Tenderness] : no CVA  tenderness [No Spinal Tenderness] : no spinal tenderness [No Joint Swelling] : no joint swelling [Grossly Normal Strength/Tone] : grossly normal strength/tone [No Rash] : no rash [Coordination Grossly Intact] : coordination grossly intact [No Focal Deficits] : no focal deficits [Normal Gait] : normal gait [Speech Grossly Normal] : speech grossly normal [Normal Affect] : the affect was normal [Alert and Oriented x3] : oriented to person, place, and time [Normal Mood] : the mood was normal [de-identified] : female in stated age,

## 2023-01-04 NOTE — ASSESSMENT
[FreeTextEntry1] : Patient is up-to-date with Pap smear and will have 1 st screening mammogram after she turns 37 y/o in 10 days.  She was reminded to have routine eye exam and dental care.  I also gave a prescription to check routine labs.

## 2023-01-04 NOTE — HEALTH RISK ASSESSMENT
[Good] : ~his/her~  mood as  good [Never] : Never [Yes] : Yes [Monthly or less (1 pt)] : Monthly or less (1 point) [1 or 2 (0 pts)] : 1 or 2 (0 points) [Never (0 pts)] : Never (0 points) [No] : In the past 12 months have you used drugs other than those required for medical reasons? No [No falls in past year] : Patient reported no falls in the past year [0] : 2) Feeling down, depressed, or hopeless: Not at all (0) [PHQ-2 Negative - No further assessment needed] : PHQ-2 Negative - No further assessment needed [None] : None [With Family] : lives with family [# of Members in Household ___] :  household currently consist of [unfilled] member(s) [Employed] : employed [Graduate School] : graduate school [] :  [# Of Children ___] : has [unfilled] children [Feels Safe at Home] : Feels safe at home [Fully functional (bathing, dressing, toileting, transferring, walking, feeding)] : Fully functional (bathing, dressing, toileting, transferring, walking, feeding) [Fully functional (using the telephone, shopping, preparing meals, housekeeping, doing laundry, using] : Fully functional and needs no help or supervision to perform IADLs (using the telephone, shopping, preparing meals, housekeeping, doing laundry, using transportation, managing medications and managing finances) [Smoke Detector] : smoke detector [Carbon Monoxide Detector] : carbon monoxide detector [Seat Belt] :  uses seat belt [Audit-CScore] : 1 [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

## 2023-01-04 NOTE — HISTORY OF PRESENT ILLNESS
[FreeTextEntry1] : Pt presented for PE.  Last PE was 6/2021. [de-identified] : She feels well and has no new complaint.\par \par She had COVID infection in 5/2022, it was mild and she recovered completely.   She had 3 doses of COVID vaccine.  She already had Flu vaccine at work.\par \par She sees a fertility specialist, she became pregnant and then miscarried again a few months ago.

## 2023-01-09 LAB
ALBUMIN SERPL ELPH-MCNC: 4.5 G/DL
ALP BLD-CCNC: 52 U/L
ALT SERPL-CCNC: 12 U/L
ANION GAP SERPL CALC-SCNC: 13 MMOL/L
APPEARANCE: ABNORMAL
AST SERPL-CCNC: 17 U/L
BASOPHILS # BLD AUTO: 0.08 K/UL
BASOPHILS NFR BLD AUTO: 0.8 %
BILIRUB SERPL-MCNC: 0.3 MG/DL
BILIRUBIN URINE: NEGATIVE
BLOOD URINE: NEGATIVE
BUN SERPL-MCNC: 11 MG/DL
CALCIUM SERPL-MCNC: 9.7 MG/DL
CHLORIDE SERPL-SCNC: 100 MMOL/L
CHOLEST SERPL-MCNC: 219 MG/DL
CO2 SERPL-SCNC: 25 MMOL/L
COLOR: NORMAL
CREAT SERPL-MCNC: 0.71 MG/DL
EGFR: 111 ML/MIN/1.73M2
EOSINOPHIL # BLD AUTO: 0.34 K/UL
EOSINOPHIL NFR BLD AUTO: 3.4 %
ESTIMATED AVERAGE GLUCOSE: 105 MG/DL
GLUCOSE QUALITATIVE U: NEGATIVE
GLUCOSE SERPL-MCNC: 83 MG/DL
HBA1C MFR BLD HPLC: 5.3 %
HCT VFR BLD CALC: 39.1 %
HDLC SERPL-MCNC: 61 MG/DL
HGB BLD-MCNC: 13 G/DL
IMM GRANULOCYTES NFR BLD AUTO: 0.2 %
KETONES URINE: NEGATIVE
LDLC SERPL CALC-MCNC: 137 MG/DL
LEUKOCYTE ESTERASE URINE: ABNORMAL
LYMPHOCYTES # BLD AUTO: 1.83 K/UL
LYMPHOCYTES NFR BLD AUTO: 18.4 %
MAN DIFF?: NORMAL
MCHC RBC-ENTMCNC: 28.8 PG
MCHC RBC-ENTMCNC: 33.2 GM/DL
MCV RBC AUTO: 86.5 FL
MONOCYTES # BLD AUTO: 0.51 K/UL
MONOCYTES NFR BLD AUTO: 5.1 %
NEUTROPHILS # BLD AUTO: 7.15 K/UL
NEUTROPHILS NFR BLD AUTO: 72.1 %
NITRITE URINE: NEGATIVE
NONHDLC SERPL-MCNC: 158 MG/DL
PH URINE: 7
PLATELET # BLD AUTO: 366 K/UL
POTASSIUM SERPL-SCNC: 4.5 MMOL/L
PROT SERPL-MCNC: 6.6 G/DL
PROTEIN URINE: NEGATIVE
RBC # BLD: 4.52 M/UL
RBC # FLD: 12.7 %
SODIUM SERPL-SCNC: 138 MMOL/L
SPECIFIC GRAVITY URINE: 1.01
T4 FREE SERPL-MCNC: 1.3 NG/DL
TRIGL SERPL-MCNC: 104 MG/DL
TSH SERPL-ACNC: 0.58 UIU/ML
UROBILINOGEN URINE: NORMAL
WBC # FLD AUTO: 9.93 K/UL

## 2023-02-01 ENCOUNTER — APPOINTMENT (OUTPATIENT)
Dept: MAMMOGRAPHY | Facility: CLINIC | Age: 38
End: 2023-02-01
Payer: COMMERCIAL

## 2023-02-01 ENCOUNTER — RESULT REVIEW (OUTPATIENT)
Age: 38
End: 2023-02-01

## 2023-02-01 PROCEDURE — 77067 SCR MAMMO BI INCL CAD: CPT

## 2023-02-01 PROCEDURE — 77063 BREAST TOMOSYNTHESIS BI: CPT

## 2023-02-02 DIAGNOSIS — R92.2 INCONCLUSIVE MAMMOGRAM: ICD-10-CM

## 2023-03-03 ENCOUNTER — APPOINTMENT (OUTPATIENT)
Dept: INTERNAL MEDICINE | Facility: CLINIC | Age: 38
End: 2023-03-03
Payer: COMMERCIAL

## 2023-03-03 DIAGNOSIS — J06.9 ACUTE UPPER RESPIRATORY INFECTION, UNSPECIFIED: ICD-10-CM

## 2023-03-03 PROCEDURE — 99213 OFFICE O/P EST LOW 20 MIN: CPT | Mod: 95

## 2023-03-03 RX ORDER — AZITHROMYCIN 250 MG/1
250 TABLET, FILM COATED ORAL
Qty: 1 | Refills: 0 | Status: ACTIVE | COMMUNITY
Start: 2023-03-03 | End: 1900-01-01

## 2023-03-03 NOTE — PHYSICAL EXAM
[No Acute Distress] : no acute distress [Well Nourished] : well nourished [Well Developed] : well developed [No Respiratory Distress] : no respiratory distress  [Speech Grossly Normal] : speech grossly normal [Normal Affect] : the affect was normal [Alert and Oriented x3] : oriented to person, place, and time [Normal Mood] : the mood was normal [de-identified] : female in stated age, exam limited due to telehealth encounter,

## 2023-03-03 NOTE — HISTORY OF PRESENT ILLNESS
[Cold Symptoms] : cold symptoms [Moderate] : moderate [___ Days ago] : [unfilled] days ago [Gradual] : gradually [Constant] : constant [Congestion] : congestion [Cough] : cough [At Night] : at night [Stable] : stable [Sore Throat] : no sore throat [Wheezing] : no wheezing [Chills] : no chills [Anorexia] : no anorexia [Shortness Of Breath] : no shortness of breath [Fatigue] : not fatigue [Headache] : no headache [Fever] : no fever [FreeTextEntry2] : Chest feels tight, [FreeTextEntry5] : Albuterol inhaler, Flonase NS, Robitussin,  [FreeTextEntry8] : Pt went to urgent care center yesterday, and had negative COVID tests.\par \par Her daughter was tested positive for rhinovirus a few days ago.\par \par Pt also had an embryo implanted last week, it is possible that she  may be pregnant, and was told she can not take a lot of meds, including decongestant or steroid.

## 2023-03-07 ENCOUNTER — OFFICE (OUTPATIENT)
Dept: URBAN - METROPOLITAN AREA CLINIC 34 | Facility: CLINIC | Age: 38
Setting detail: OPHTHALMOLOGY
End: 2023-03-07
Payer: COMMERCIAL

## 2023-03-07 DIAGNOSIS — H25.13: ICD-10-CM

## 2023-03-07 DIAGNOSIS — B30.9: ICD-10-CM

## 2023-03-07 DIAGNOSIS — H01.004: ICD-10-CM

## 2023-03-07 PROCEDURE — 92002 INTRM OPH EXAM NEW PATIENT: CPT | Performed by: OPHTHALMOLOGY

## 2023-03-07 ASSESSMENT — REFRACTION_CURRENTRX
OS_OVR_VA: 20/
OD_AXIS: 068
OD_SPHERE: +2.50
OD_CYLINDER: +0.50
OD_OVR_VA: 20/
OD_VPRISM_DIRECTION: SV
OS_SPHERE: +3.50
OS_AXIS: 102
OS_VPRISM_DIRECTION: SV
OS_CYLINDER: +0.50

## 2023-03-07 ASSESSMENT — CONFRONTATIONAL VISUAL FIELD TEST (CVF)
OD_FINDINGS: FULL
OS_FINDINGS: FULL

## 2023-03-07 ASSESSMENT — VISUAL ACUITY
OD_BCVA: 20/20
OS_BCVA: 20/20

## 2023-03-07 ASSESSMENT — LID EXAM ASSESSMENTS
OS_COMMENTS: T1, HYPEREMIA
OS_BLEPHARITIS: LUL T

## 2023-09-13 ENCOUNTER — APPOINTMENT (OUTPATIENT)
Dept: OBGYN | Facility: CLINIC | Age: 38
End: 2023-09-13
Payer: COMMERCIAL

## 2023-09-13 PROCEDURE — 36415 COLL VENOUS BLD VENIPUNCTURE: CPT

## 2023-09-13 PROCEDURE — 99214 OFFICE O/P EST MOD 30 MIN: CPT | Mod: 25

## 2023-09-13 PROCEDURE — 76817 TRANSVAGINAL US OBSTETRIC: CPT

## 2023-09-15 ENCOUNTER — APPOINTMENT (OUTPATIENT)
Dept: OBGYN | Facility: CLINIC | Age: 38
End: 2023-09-15
Payer: COMMERCIAL

## 2023-09-15 PROCEDURE — 76817 TRANSVAGINAL US OBSTETRIC: CPT

## 2023-09-15 PROCEDURE — 0502F SUBSEQUENT PRENATAL CARE: CPT

## 2023-09-22 ENCOUNTER — APPOINTMENT (OUTPATIENT)
Dept: OBGYN | Facility: CLINIC | Age: 38
End: 2023-09-22
Payer: COMMERCIAL

## 2023-09-22 PROCEDURE — 36415 COLL VENOUS BLD VENIPUNCTURE: CPT

## 2023-09-22 PROCEDURE — 76813 OB US NUCHAL MEAS 1 GEST: CPT

## 2023-09-22 PROCEDURE — 0502F SUBSEQUENT PRENATAL CARE: CPT

## 2023-10-06 ENCOUNTER — APPOINTMENT (OUTPATIENT)
Dept: OBGYN | Facility: CLINIC | Age: 38
End: 2023-10-06
Payer: COMMERCIAL

## 2023-10-06 PROCEDURE — 0502F SUBSEQUENT PRENATAL CARE: CPT

## 2023-10-20 ENCOUNTER — APPOINTMENT (OUTPATIENT)
Dept: OBGYN | Facility: CLINIC | Age: 38
End: 2023-10-20
Payer: COMMERCIAL

## 2023-10-20 PROCEDURE — 76815 OB US LIMITED FETUS(S): CPT

## 2023-10-20 PROCEDURE — 0502F SUBSEQUENT PRENATAL CARE: CPT

## 2023-11-02 ENCOUNTER — APPOINTMENT (OUTPATIENT)
Dept: OBGYN | Facility: CLINIC | Age: 38
End: 2023-11-02
Payer: COMMERCIAL

## 2023-11-02 PROCEDURE — 0502F SUBSEQUENT PRENATAL CARE: CPT

## 2023-11-20 ENCOUNTER — ASOB RESULT (OUTPATIENT)
Age: 38
End: 2023-11-20

## 2023-11-20 ENCOUNTER — APPOINTMENT (OUTPATIENT)
Dept: ANTEPARTUM | Facility: CLINIC | Age: 38
End: 2023-11-20
Payer: COMMERCIAL

## 2023-11-20 ENCOUNTER — APPOINTMENT (OUTPATIENT)
Dept: OBGYN | Facility: CLINIC | Age: 38
End: 2023-11-20
Payer: COMMERCIAL

## 2023-11-20 PROCEDURE — 0502F SUBSEQUENT PRENATAL CARE: CPT

## 2023-11-20 PROCEDURE — 76811 OB US DETAILED SNGL FETUS: CPT

## 2023-12-05 ENCOUNTER — APPOINTMENT (OUTPATIENT)
Dept: OBGYN | Facility: CLINIC | Age: 38
End: 2023-12-05
Payer: COMMERCIAL

## 2023-12-05 PROCEDURE — 0502F SUBSEQUENT PRENATAL CARE: CPT

## 2023-12-18 ENCOUNTER — APPOINTMENT (OUTPATIENT)
Dept: OBGYN | Facility: CLINIC | Age: 38
End: 2023-12-18
Payer: COMMERCIAL

## 2023-12-18 PROCEDURE — 0502F SUBSEQUENT PRENATAL CARE: CPT

## 2023-12-29 ENCOUNTER — APPOINTMENT (OUTPATIENT)
Dept: OBGYN | Facility: CLINIC | Age: 38
End: 2023-12-29
Payer: COMMERCIAL

## 2023-12-29 PROCEDURE — 99213 OFFICE O/P EST LOW 20 MIN: CPT

## 2024-01-02 ENCOUNTER — APPOINTMENT (OUTPATIENT)
Dept: OBGYN | Facility: CLINIC | Age: 39
End: 2024-01-02

## 2024-01-12 ENCOUNTER — APPOINTMENT (OUTPATIENT)
Dept: OBGYN | Facility: CLINIC | Age: 39
End: 2024-01-12
Payer: COMMERCIAL

## 2024-01-12 PROCEDURE — 0502F SUBSEQUENT PRENATAL CARE: CPT

## 2024-01-12 PROCEDURE — 36415 COLL VENOUS BLD VENIPUNCTURE: CPT

## 2024-01-12 PROCEDURE — 76816 OB US FOLLOW-UP PER FETUS: CPT

## 2024-01-25 ENCOUNTER — APPOINTMENT (OUTPATIENT)
Dept: OBGYN | Facility: CLINIC | Age: 39
End: 2024-01-25

## 2024-01-26 ENCOUNTER — APPOINTMENT (OUTPATIENT)
Dept: OBGYN | Facility: CLINIC | Age: 39
End: 2024-01-26
Payer: COMMERCIAL

## 2024-01-26 PROCEDURE — 0502F SUBSEQUENT PRENATAL CARE: CPT

## 2024-01-26 PROCEDURE — 36415 COLL VENOUS BLD VENIPUNCTURE: CPT

## 2024-01-29 ENCOUNTER — APPOINTMENT (OUTPATIENT)
Dept: MATERNAL FETAL MEDICINE | Facility: CLINIC | Age: 39
End: 2024-01-29

## 2024-02-08 ENCOUNTER — APPOINTMENT (OUTPATIENT)
Dept: OBGYN | Facility: CLINIC | Age: 39
End: 2024-02-08
Payer: COMMERCIAL

## 2024-02-08 PROCEDURE — 76819 FETAL BIOPHYS PROFIL W/O NST: CPT | Mod: 59

## 2024-02-08 PROCEDURE — 76816 OB US FOLLOW-UP PER FETUS: CPT

## 2024-02-08 PROCEDURE — 90715 TDAP VACCINE 7 YRS/> IM: CPT

## 2024-02-08 PROCEDURE — 0502F SUBSEQUENT PRENATAL CARE: CPT

## 2024-02-08 PROCEDURE — 90471 IMMUNIZATION ADMIN: CPT

## 2024-02-23 ENCOUNTER — APPOINTMENT (OUTPATIENT)
Dept: OBGYN | Facility: CLINIC | Age: 39
End: 2024-02-23
Payer: COMMERCIAL

## 2024-02-23 PROCEDURE — 0502F SUBSEQUENT PRENATAL CARE: CPT

## 2024-03-08 ENCOUNTER — APPOINTMENT (OUTPATIENT)
Dept: OBGYN | Facility: CLINIC | Age: 39
End: 2024-03-08
Payer: COMMERCIAL

## 2024-03-08 PROCEDURE — 76816 OB US FOLLOW-UP PER FETUS: CPT

## 2024-03-08 PROCEDURE — 0502F SUBSEQUENT PRENATAL CARE: CPT

## 2024-03-08 PROCEDURE — 76818 FETAL BIOPHYS PROFILE W/NST: CPT | Mod: 59

## 2024-03-09 NOTE — H&P PST ADULT - HEART RATE (BEATS/MIN)
CHIEF COMPLAINT:     Chief Complaint   Patient presents with    Fever     2 different friends exposed to strep, and we're all down with fevers - Entered by patient  Started yesterday morning        HPI:   Gabriela Rowland is a 44 year old female who presents with fever, bodyache, chills, nose is draining, pnd, cough.  Symptoms began yesterday morning.  She is concerned about strep since two friends probably have it.       Associated symptoms:    Fever/Chills  Yes tmax 100.4  Sore throat  Yes mild  Cough  Yes   Congestion Yes  Bodyache  Yes  Headache  Yes  Chest pain No  SOB/Dyspnea No  Loss of taste No  Loss of smell No  Diarrhea No  Vomiting No    Did have influenza vaccination this season.    Covid vaccinations x4.    No known influenza or covid exposures.    + tobacco    Current Outpatient Medications   Medication Sig Dispense Refill    oseltamivir (TAMIFLU) 75 MG Oral Cap Take 1 capsule (75 mg total) by mouth 2 (two) times daily for 5 days. 10 capsule 0    benzonatate 200 MG Oral Cap Take 1 capsule (200 mg total) by mouth 3 (three) times daily as needed for cough. 30 capsule 0    oseltamivir 75 MG Oral Cap Take 1 capsule (75 mg total) by mouth 2 (two) times daily for 5 days. 10 capsule 0    benzonatate 200 MG Oral Cap Take 1 capsule (200 mg total) by mouth 3 (three) times daily as needed for cough. 30 capsule 0    ketoconazole 2 % External Cream Apply 1 Application topically daily. (Patient not taking: Reported on 3/9/2024) 30 g 1    Cholecalciferol 5000 units Oral Tab Take 1 tablet (5,000 Units total) by mouth daily. (Patient not taking: Reported on 5/13/2023) 1 tablet 0      Past Medical History:   Diagnosis Date    Abdominal pain     Something usually hurts in there    Anxiety     Blood in urine 4/22/22    Calculus of kidney     Feeling lonely     Flatulence/gas pain/belching     Lifelong in excess    Frequent urination 4/22/22    Frequent UTI     History of depression     Menses painful     Night sweats      2 months roughly    Pain in joints     Painful swallowing 5/3/22    Painful urination 4/22/22    Sleep disturbance     3 months    Stress     Vomiting blood 4/1/22    Just the 1 time    Wears glasses       Social History:  Social History     Socioeconomic History    Marital status:    Tobacco Use    Smoking status: Every Day     Packs/day: 0.50     Years: 20.00     Additional pack years: 0.00     Total pack years: 10.00     Types: Cigarettes    Smokeless tobacco: Never   Vaping Use    Vaping Use: Never used   Substance and Sexual Activity    Alcohol use: Yes     Alcohol/week: 6.0 standard drinks of alcohol     Types: 6 Shots of liquor per week    Drug use: No        Review of Systems:    Positive for stated complaint: cough, congestion. .   Pertinent positives and negatives noted in the the HPI.    EXAM:   /78   Pulse 100   Temp 99.5 °F (37.5 °C)   Resp 18   Ht 5' 5\" (1.651 m)   Wt 145 lb (65.8 kg)   LMP 02/29/2024 (Approximate)   SpO2 97%   BMI 24.13 kg/m²   GENERAL: well developed, well nourished,in no apparent distress  SKIN: no rashes,no suspicious lesions  HEAD: atraumatic, normocephalic  EYES: conjunctiva clear, sclera white,  PERRLA  EARS: TM's non erythematous  NOSE: nares patent, mucosa mild congestion  THROAT: Posterior pharynx is mildly erythematous, tonsils are absent, no exudates  NECK: supple, non-tender  LUNGS: clear to auscultation bilaterally without rale, ronchi, wheeze.  CARDIO: S1/S2 without murmur  GI: BS's present x4. No palpable masses or organomegaly.  no tenderness on palpation.  EXTREMITIES: no cyanosis, clubbing or edema  LYMPH:  small tonsillar lymphadenopathy.      Recent Results (from the past 24 hour(s))   Strep A Assay W/Optic    Collection Time: 03/09/24 12:25 PM   Result Value Ref Range    Strep Grp A Screen Negative Negative    Control Line Present with a clear background (yes/no) yes Yes/No    Kit Lot # [id number] Numeric    Kit Expiration Date 04/22/2025 Date    COVID-19 LAB TEST NON-CDC    Collection Time: 03/09/24 12:39 PM    Specimen: Nares   Result Value Ref Range    Rapid SARS-CoV-2 by PCR Not Detected Not Detected    POCT Lot Number [id number]     POCT Expiration Date 04/22/2025     POCT Procedure Control Control Valid Control Valid     ,603          ASSESSMENT AND PLAN:   Gabriela Rowland is a 44 year old female who presents with Fever (2 different friends exposed to strep, and we're all down with fevers - Entered by patient/Started yesterday morning ). Symptoms are consistent with:      ASSESSMENT:  Encounter Diagnosis   Name Primary?    Sore throat Yes         PLAN: Covid Test Rapid ID Now is negative.   RSS is negative.    Will treat empirically for influenza.       Symptomatic care:   1. Rest. Drink plenty of fluids.  2. Tylenol or ibuprofen for discomfort or fever.   3. OTC decongestant (phenylephrine) expectorants (guaifenesin), nasal steroid sprays  (fluticasone) may be helpful        for congestion.  4. OTC cough suppressant for cough  (dextromethorphan)  5. Chloraseptic spray/throat lozenges for sore throat   6 tamiflu 75 mg bid for 5 days.   7 tessalon 200 tid prn       Go to the ED for evaluation with progressive symptoms of difficulty swallowing, breathing, shortness of breath, chest pain, extreme weakness, or confusion.         Meds & Refills for this Visit:  Requested Prescriptions     Signed Prescriptions Disp Refills    oseltamivir (TAMIFLU) 75 MG Oral Cap 10 capsule 0     Sig: Take 1 capsule (75 mg total) by mouth 2 (two) times daily for 5 days.    benzonatate 200 MG Oral Cap 30 capsule 0     Sig: Take 1 capsule (200 mg total) by mouth 3 (three) times daily as needed for cough.    oseltamivir 75 MG Oral Cap 10 capsule 0     Sig: Take 1 capsule (75 mg total) by mouth 2 (two) times daily for 5 days.    benzonatate 200 MG Oral Cap 30 capsule 0     Sig: Take 1 capsule (200 mg total) by mouth 3 (three) times daily as needed for cough.        Risks, benefits, side effects of medication addressed and explained.    There are no Patient Instructions on file for this visit.    The patient indicates understanding of these issues and agrees to the plan.  The patient is asked to follow up PCP       62

## 2024-03-15 ENCOUNTER — APPOINTMENT (OUTPATIENT)
Dept: OBGYN | Facility: CLINIC | Age: 39
End: 2024-03-15
Payer: COMMERCIAL

## 2024-03-15 PROCEDURE — 76818 FETAL BIOPHYS PROFILE W/NST: CPT

## 2024-03-15 PROCEDURE — 0502F SUBSEQUENT PRENATAL CARE: CPT

## 2024-03-21 ENCOUNTER — APPOINTMENT (OUTPATIENT)
Dept: OBGYN | Facility: CLINIC | Age: 39
End: 2024-03-21
Payer: COMMERCIAL

## 2024-03-21 PROCEDURE — 0502F SUBSEQUENT PRENATAL CARE: CPT

## 2024-03-21 PROCEDURE — 76816 OB US FOLLOW-UP PER FETUS: CPT

## 2024-03-21 PROCEDURE — 76818 FETAL BIOPHYS PROFILE W/NST: CPT | Mod: 59

## 2024-03-28 ENCOUNTER — APPOINTMENT (OUTPATIENT)
Dept: OBGYN | Facility: CLINIC | Age: 39
End: 2024-03-28
Payer: COMMERCIAL

## 2024-03-28 PROCEDURE — 76818 FETAL BIOPHYS PROFILE W/NST: CPT

## 2024-03-28 PROCEDURE — 0502F SUBSEQUENT PRENATAL CARE: CPT

## 2024-03-31 ENCOUNTER — INPATIENT (INPATIENT)
Facility: HOSPITAL | Age: 39
LOS: 1 days | Discharge: HOME | End: 2024-04-02
Attending: STUDENT IN AN ORGANIZED HEALTH CARE EDUCATION/TRAINING PROGRAM | Admitting: STUDENT IN AN ORGANIZED HEALTH CARE EDUCATION/TRAINING PROGRAM
Payer: COMMERCIAL

## 2024-03-31 VITALS — OXYGEN SATURATION: 96 % | HEART RATE: 70 BPM | SYSTOLIC BLOOD PRESSURE: 137 MMHG | DIASTOLIC BLOOD PRESSURE: 66 MMHG

## 2024-03-31 DIAGNOSIS — Z33.1 PREGNANT STATE, INCIDENTAL: ICD-10-CM

## 2024-03-31 DIAGNOSIS — Z98.890 OTHER SPECIFIED POSTPROCEDURAL STATES: Chronic | ICD-10-CM

## 2024-03-31 DIAGNOSIS — Z3A.39 39 WEEKS GESTATION OF PREGNANCY: ICD-10-CM

## 2024-03-31 LAB
BASOPHILS # BLD AUTO: 0.03 K/UL — SIGNIFICANT CHANGE UP (ref 0–0.2)
BASOPHILS NFR BLD AUTO: 0.3 % — SIGNIFICANT CHANGE UP (ref 0–2)
BLD GP AB SCN SERPL QL: NEGATIVE — SIGNIFICANT CHANGE UP
EOSINOPHIL # BLD AUTO: 0.16 K/UL — SIGNIFICANT CHANGE UP (ref 0–0.5)
EOSINOPHIL NFR BLD AUTO: 1.8 % — SIGNIFICANT CHANGE UP (ref 0–6)
HCT VFR BLD CALC: 37.9 % — SIGNIFICANT CHANGE UP (ref 34.5–45)
HGB BLD-MCNC: 12.3 G/DL — SIGNIFICANT CHANGE UP (ref 11.5–15.5)
IMM GRANULOCYTES NFR BLD AUTO: 0.3 % — SIGNIFICANT CHANGE UP (ref 0–0.9)
LYMPHOCYTES # BLD AUTO: 1.54 K/UL — SIGNIFICANT CHANGE UP (ref 1–3.3)
LYMPHOCYTES # BLD AUTO: 17.6 % — SIGNIFICANT CHANGE UP (ref 13–44)
MCHC RBC-ENTMCNC: 28.7 PG — SIGNIFICANT CHANGE UP (ref 27–34)
MCHC RBC-ENTMCNC: 32.5 GM/DL — SIGNIFICANT CHANGE UP (ref 32–36)
MCV RBC AUTO: 88.6 FL — SIGNIFICANT CHANGE UP (ref 80–100)
MONOCYTES # BLD AUTO: 0.86 K/UL — SIGNIFICANT CHANGE UP (ref 0–0.9)
MONOCYTES NFR BLD AUTO: 9.8 % — SIGNIFICANT CHANGE UP (ref 2–14)
NEUTROPHILS # BLD AUTO: 6.13 K/UL — SIGNIFICANT CHANGE UP (ref 1.8–7.4)
NEUTROPHILS NFR BLD AUTO: 70.2 % — SIGNIFICANT CHANGE UP (ref 43–77)
NRBC # BLD: 0 /100 WBCS — SIGNIFICANT CHANGE UP (ref 0–0)
PLATELET # BLD AUTO: 209 K/UL — SIGNIFICANT CHANGE UP (ref 150–400)
RBC # BLD: 4.28 M/UL — SIGNIFICANT CHANGE UP (ref 3.8–5.2)
RBC # FLD: 12.8 % — SIGNIFICANT CHANGE UP (ref 10.3–14.5)
RH IG SCN BLD-IMP: POSITIVE — SIGNIFICANT CHANGE UP
WBC # BLD: 8.75 K/UL — SIGNIFICANT CHANGE UP (ref 3.8–10.5)
WBC # FLD AUTO: 8.75 K/UL — SIGNIFICANT CHANGE UP (ref 3.8–10.5)

## 2024-03-31 RX ORDER — SODIUM CHLORIDE 9 MG/ML
1000 INJECTION, SOLUTION INTRAVENOUS
Refills: 0 | Status: DISCONTINUED | OUTPATIENT
Start: 2024-03-31 | End: 2024-04-01

## 2024-03-31 RX ORDER — OXYTOCIN 10 UNIT/ML
333.33 VIAL (ML) INJECTION
Qty: 20 | Refills: 0 | Status: DISCONTINUED | OUTPATIENT
Start: 2024-03-31 | End: 2024-04-02

## 2024-03-31 RX ORDER — CHLORHEXIDINE GLUCONATE 213 G/1000ML
1 SOLUTION TOPICAL DAILY
Refills: 0 | Status: DISCONTINUED | OUTPATIENT
Start: 2024-03-31 | End: 2024-04-01

## 2024-03-31 RX ORDER — CITRIC ACID/SODIUM CITRATE 300-500 MG
15 SOLUTION, ORAL ORAL EVERY 6 HOURS
Refills: 0 | Status: DISCONTINUED | OUTPATIENT
Start: 2024-03-31 | End: 2024-04-01

## 2024-03-31 RX ADMIN — SODIUM CHLORIDE 125 MILLILITER(S): 9 INJECTION, SOLUTION INTRAVENOUS at 21:31

## 2024-03-31 NOTE — CHART NOTE - NSCHARTNOTEFT_GEN_A_CORE
R1 Clinical Update    Pt is a  admitted for IOL for AMA. Pt being induced with PO cytotec and cervical balloon. Upon discussion of placement of cervical balloon, pt requesting epidural before cervical balloon placement. Cervical balloon placement to be delayed due to epidural placement.    Tiffanie Cornejo PGY1

## 2024-03-31 NOTE — OB PROVIDER H&P - NS_OBGYNHISTORY_OBGYN_ALL_OB_FT
– PNC: IVF pregnancy. Denies any other prenatal issues. GBS negative. EFW 3260g by lnyda in office on Thursday.  – OBHx:   1)  2014 FT, without complications  2)   FT, without complications  3)   FT, c/b subchorionic hematoma at 17 weeks that resolved  4) MAB x7 including D&C x2 (, ), pt notes workup completed previously without any explanation for hx of MAB   – GynHx: PCOS on Metformin 750mg BID. Denies fibroids, abnormal pap smears, STIs.

## 2024-03-31 NOTE — OB PROVIDER H&P - PROBLEM SELECTOR PLAN 1
Plan  1. Admit to L&D. Routine Labs. IVF.  2. IOL with PO cytotec and cervical balloon  3. Fetus: cat 1 tracing. VTX. EFW 3260g by sono in office on Thursday. Continuous EFM. Sono. No concerns.  4. Prenatal issues: IVF pregnancy.   5. GBS negative  6. Pain: IV pain meds/epidural PRN    Dr. Nino and Dr. Kelsey aware  Carlee Lopez PA-C

## 2024-03-31 NOTE — OB PROVIDER H&P - NSICDXPASTMEDICALHX_GEN_ALL_CORE_FT
PAST MEDICAL HISTORY:  Missed       (normal spontaneous vaginal delivery) , 2016,     PCOS (polycystic ovarian syndrome)     Subchorionic hematoma  during pregnancy

## 2024-03-31 NOTE — OB PROVIDER H&P - NSICDXPASTSURGICALHX_GEN_ALL_CORE_FT
PAST SURGICAL HISTORY:  History of dilation and curettage miscarriage 2018    History of hysteroscopy

## 2024-03-31 NOTE — OB RN PATIENT PROFILE - POST PARTUM DEPRESSION SCREEN OB 4
Report using a coping skill to overcome sadness and worry in order to socialize with peers daily Report using a coping skill to overcome sadness and worry in order to socialize with peers daily no Report using a coping skill to overcome sadness and worry in order to socialize with peers daily Report using a coping skill to overcome sadness and worry in order to socialize with peers daily Report using a coping skill to overcome sadness and worry in order to socialize with peers daily Report using a coping skill to overcome sadness and worry in order to socialize with peers daily Report using a coping skill to overcome sadness and worry in order to socialize with peers daily Report using a coping skill to overcome sadness and worry in order to socialize with peers daily Report using a coping skill to overcome sadness and worry in order to socialize with peers daily Report using a coping skill to overcome sadness and worry in order to socialize with peers daily Report using a coping skill to overcome sadness and worry in order to socialize with peers daily Will identify thoughts and self-talk that contribute to depression Report using a coping skill to overcome sadness and worry in order to socialize with peers daily Report using a coping skill to overcome sadness and worry in order to socialize with peers daily Report using a coping skill to overcome sadness and worry in order to socialize with peers daily

## 2024-03-31 NOTE — OB PROVIDER H&P - NSHPPHYSICALEXAM_GEN_ALL_CORE
Objective  – VS  ICU Vital Signs Last 24 Hrs  HR: 67 (31 Mar 2024 21:22) (64 - 76)  BP: 137/66 (31 Mar 2024 20:33) (137/66 - 137/66)  SpO2: 94% (31 Mar 2024 21:22) (93% - 98%)  – PE:   General: NAD  CV: RRR  Pulm: breathing comfortably on RA, clear to auscultation b/l  Abd: gravid, nontender  Extr: moving all extremities with ease, nonedematous, non-TTP  – VE: 2.5/50/-3  – FHT: 130/moderate variability/+accels/-decels  – [place]: irregular ctx  – EFW: 3260g by soncecilia in office on Thursday  – Sono: vertex

## 2024-03-31 NOTE — OB RN PATIENT PROFILE - NS_OBGYNHISTORY_OBGYN_ALL_OB_FT
– PNC: IVF pregnancy. Denies any other prenatal issues. GBS negative. EFW 3260g by lynda in office on Thursday.  – OBHx:   1)  2014 FT, without complications  2)   FT, without complications  3)   FT, c/b subchorionic hematoma at 17 weeks that resolved  4) MAB x7 including D&C x2 (, ), pt notes workup completed previously without any explanation for hx of MAB   – GynHx: PCOS on Metformin 750mg BID. Denies fibroids, abnormal pap smears, STIs.

## 2024-03-31 NOTE — OB PROVIDER H&P - HISTORY OF PRESENT ILLNESS
PA Admission H&P    Subjective  HPI: 39F  39.1wks presents for a scheduled IOL for AMA. +FM. -LOF. -CTXs. -VB. Pt denies any other concerns. Of note, this is an IVF pregnancy.    – PNC: IVF pregnancy. Denies any other prenatal issues. GBS negative. EFW 3260g by lynda in office on Thursday.  – OBHx:   1)   FT, without complications  2)   FT, without complications  3)   FT, c/b subchorionic hematoma at 17 weeks that resolved  4) MAB x7 including D&C x2 (, ), pt notes workup completed previously without any explanation for hx of MAB   – GynHx: PCOS on Metformin 750mg BID. Denies fibroids, abnormal pap smears, STIs.  – PMH: Denies asthma, thyroid disorders, renal/liver disease, bleeding/clotting disorders, or any other PMHx  – PSH: D&C , D&C , 2 diagnostic hysteroscopies (scar tissue noted)  – Psych: Denies anxiety, depression  – Social: Denies T/E/D  – Meds: PNV, Asp 81mg QD, Metformin 750mg BID  – Allergies: NKDA  – Will accept blood transfusions? Yes PA Admission H&P    Subjective  HPI: 39F  39.1wks presents for a scheduled IOL for AMA. +FM. -LOF. -CTXs. -VB. Pt denies any other concerns. Of note, this is an IVF pregnancy.    – PNC: IVF pregnancy. Denies any other prenatal issues. GBS negative. EFW 3260g by lynda in office on Thursday.  – OBHx:   1)   FT 6lb 10oz, without complications  2)   FT 7lb 4oz, without complications  3)   FT 8lb 7oz, c/b subchorionic hematoma at 17 weeks that resolved  4) MAB x7 including D&C x2 (, ), pt notes workup completed previously without any explanation for hx of MAB   – GynHx: PCOS on Metformin 750mg BID. Denies fibroids, abnormal pap smears, STIs.  – PMH: Denies asthma, thyroid disorders, renal/liver disease, bleeding/clotting disorders, or any other PMHx  – PSH: D&C , D&C , 2 diagnostic hysteroscopies (scar tissue noted)  – Psych: Denies anxiety, depression  – Social: Denies T/E/D  – Meds: PNV, Asp 81mg QD, Metformin 750mg BID  – Allergies: NKDA  – Will accept blood transfusions? Yes

## 2024-04-01 LAB — T PALLIDUM AB TITR SER: NEGATIVE — SIGNIFICANT CHANGE UP

## 2024-04-01 PROCEDURE — 59400 OBSTETRICAL CARE: CPT

## 2024-04-01 RX ORDER — TETANUS TOXOID, REDUCED DIPHTHERIA TOXOID AND ACELLULAR PERTUSSIS VACCINE, ADSORBED 5; 2.5; 8; 8; 2.5 [IU]/.5ML; [IU]/.5ML; UG/.5ML; UG/.5ML; UG/.5ML
0.5 SUSPENSION INTRAMUSCULAR ONCE
Refills: 0 | Status: DISCONTINUED | OUTPATIENT
Start: 2024-04-01 | End: 2024-04-02

## 2024-04-01 RX ORDER — BENZOCAINE 10 %
1 GEL (GRAM) MUCOUS MEMBRANE EVERY 6 HOURS
Refills: 0 | Status: DISCONTINUED | OUTPATIENT
Start: 2024-04-01 | End: 2024-04-02

## 2024-04-01 RX ORDER — SIMETHICONE 80 MG/1
80 TABLET, CHEWABLE ORAL EVERY 4 HOURS
Refills: 0 | Status: DISCONTINUED | OUTPATIENT
Start: 2024-04-01 | End: 2024-04-02

## 2024-04-01 RX ORDER — SODIUM CHLORIDE 9 MG/ML
3 INJECTION INTRAMUSCULAR; INTRAVENOUS; SUBCUTANEOUS EVERY 8 HOURS
Refills: 0 | Status: DISCONTINUED | OUTPATIENT
Start: 2024-04-01 | End: 2024-04-02

## 2024-04-01 RX ORDER — OXYCODONE HYDROCHLORIDE 5 MG/1
5 TABLET ORAL
Refills: 0 | Status: DISCONTINUED | OUTPATIENT
Start: 2024-04-01 | End: 2024-04-02

## 2024-04-01 RX ORDER — DIPHENHYDRAMINE HCL 50 MG
25 CAPSULE ORAL EVERY 6 HOURS
Refills: 0 | Status: DISCONTINUED | OUTPATIENT
Start: 2024-04-01 | End: 2024-04-02

## 2024-04-01 RX ORDER — METFORMIN HYDROCHLORIDE 850 MG/1
500 TABLET ORAL THREE TIMES A DAY
Refills: 0 | Status: DISCONTINUED | OUTPATIENT
Start: 2024-04-01 | End: 2024-04-02

## 2024-04-01 RX ORDER — OXYTOCIN 10 UNIT/ML
41.67 VIAL (ML) INJECTION
Qty: 20 | Refills: 0 | Status: DISCONTINUED | OUTPATIENT
Start: 2024-04-01 | End: 2024-04-02

## 2024-04-01 RX ORDER — DIBUCAINE 1 %
1 OINTMENT (GRAM) RECTAL EVERY 6 HOURS
Refills: 0 | Status: DISCONTINUED | OUTPATIENT
Start: 2024-04-01 | End: 2024-04-02

## 2024-04-01 RX ORDER — ACETAMINOPHEN 500 MG
975 TABLET ORAL
Refills: 0 | Status: DISCONTINUED | OUTPATIENT
Start: 2024-04-01 | End: 2024-04-02

## 2024-04-01 RX ORDER — IBUPROFEN 200 MG
600 TABLET ORAL EVERY 6 HOURS
Refills: 0 | Status: DISCONTINUED | OUTPATIENT
Start: 2024-04-01 | End: 2024-04-02

## 2024-04-01 RX ORDER — OXYTOCIN 10 UNIT/ML
4 VIAL (ML) INJECTION
Qty: 30 | Refills: 0 | Status: DISCONTINUED | OUTPATIENT
Start: 2024-04-01 | End: 2024-04-02

## 2024-04-01 RX ORDER — LANOLIN
1 OINTMENT (GRAM) TOPICAL EVERY 6 HOURS
Refills: 0 | Status: DISCONTINUED | OUTPATIENT
Start: 2024-04-01 | End: 2024-04-02

## 2024-04-01 RX ORDER — HYDROCORTISONE 1 %
1 OINTMENT (GRAM) TOPICAL EVERY 6 HOURS
Refills: 0 | Status: DISCONTINUED | OUTPATIENT
Start: 2024-04-01 | End: 2024-04-02

## 2024-04-01 RX ORDER — KETOROLAC TROMETHAMINE 30 MG/ML
30 SYRINGE (ML) INJECTION ONCE
Refills: 0 | Status: DISCONTINUED | OUTPATIENT
Start: 2024-04-01 | End: 2024-04-01

## 2024-04-01 RX ORDER — IBUPROFEN 200 MG
600 TABLET ORAL EVERY 6 HOURS
Refills: 0 | Status: COMPLETED | OUTPATIENT
Start: 2024-04-01 | End: 2025-02-28

## 2024-04-01 RX ORDER — OXYCODONE HYDROCHLORIDE 5 MG/1
5 TABLET ORAL ONCE
Refills: 0 | Status: DISCONTINUED | OUTPATIENT
Start: 2024-04-01 | End: 2024-04-02

## 2024-04-01 RX ORDER — AER TRAVELER 0.5 G/1
1 SOLUTION RECTAL; TOPICAL EVERY 4 HOURS
Refills: 0 | Status: DISCONTINUED | OUTPATIENT
Start: 2024-04-01 | End: 2024-04-02

## 2024-04-01 RX ORDER — MAGNESIUM HYDROXIDE 400 MG/1
30 TABLET, CHEWABLE ORAL
Refills: 0 | Status: DISCONTINUED | OUTPATIENT
Start: 2024-04-01 | End: 2024-04-02

## 2024-04-01 RX ORDER — PRAMOXINE HYDROCHLORIDE 150 MG/15G
1 AEROSOL, FOAM RECTAL EVERY 4 HOURS
Refills: 0 | Status: DISCONTINUED | OUTPATIENT
Start: 2024-04-01 | End: 2024-04-02

## 2024-04-01 RX ADMIN — Medication 975 MILLIGRAM(S): at 21:30

## 2024-04-01 RX ADMIN — Medication 600 MILLIGRAM(S): at 12:15

## 2024-04-01 RX ADMIN — Medication 600 MILLIGRAM(S): at 13:00

## 2024-04-01 RX ADMIN — Medication 600 MILLIGRAM(S): at 23:56

## 2024-04-01 RX ADMIN — Medication 975 MILLIGRAM(S): at 09:57

## 2024-04-01 RX ADMIN — Medication 30 MILLIGRAM(S): at 06:16

## 2024-04-01 RX ADMIN — METFORMIN HYDROCHLORIDE 500 MILLIGRAM(S): 850 TABLET ORAL at 12:17

## 2024-04-01 RX ADMIN — SODIUM CHLORIDE 3 MILLILITER(S): 9 INJECTION INTRAMUSCULAR; INTRAVENOUS; SUBCUTANEOUS at 14:43

## 2024-04-01 RX ADMIN — Medication 1 APPLICATION(S): at 20:00

## 2024-04-01 RX ADMIN — Medication 30 MILLIGRAM(S): at 08:46

## 2024-04-01 RX ADMIN — Medication 125 MILLIUNIT(S)/MIN: at 05:43

## 2024-04-01 RX ADMIN — METFORMIN HYDROCHLORIDE 500 MILLIGRAM(S): 850 TABLET ORAL at 20:49

## 2024-04-01 RX ADMIN — Medication 1 TABLET(S): at 15:05

## 2024-04-01 RX ADMIN — Medication 975 MILLIGRAM(S): at 10:30

## 2024-04-01 RX ADMIN — Medication 975 MILLIGRAM(S): at 20:49

## 2024-04-01 RX ADMIN — Medication 600 MILLIGRAM(S): at 18:33

## 2024-04-01 RX ADMIN — Medication 975 MILLIGRAM(S): at 15:43

## 2024-04-01 RX ADMIN — Medication 975 MILLIGRAM(S): at 15:05

## 2024-04-01 RX ADMIN — Medication 600 MILLIGRAM(S): at 18:00

## 2024-04-01 NOTE — OB PROVIDER DELIVERY SUMMARY - NSLOWPPHRISK_OBGYN_A_OB
No previous uterine incision/Ray Pregnancy/Less than or equal to 4 previous vaginal births/No known bleeding disorder/No history of postpartum hemorrhage/No other PPH risks indicated

## 2024-04-01 NOTE — OB PROVIDER DELIVERY SUMMARY - NSPROVIDERDELIVERYNOTE_OBGYN_ALL_OB_FT
SHEMAR NL FEMALE INFANT WITH NUCHAL X 1, DELAYED CORC CLAMPING X 30 SECS  WT 6-0  APGARS 9/9  PLACENTA SPONT INTACT---UTERUS EXPLORED AND EMPTY  MLE REPAIRED   CC'S  PT TO RR IN GOOD CONDITION  KARELY RUIZ MD

## 2024-04-01 NOTE — OB RN DELIVERY SUMMARY - NS_EXTRAMURALDEL_OBGYN_ALL_OB
Two attempts made to insert ford, both regular and coudé with no success. Ford blocks and will not pass prostate. Viraj Kwok in room to attempt and urojet inserted for comfort.      Jacqui Calhoun, RN  12/22/17 2006 No

## 2024-04-01 NOTE — OB PROVIDER LABOR PROGRESS NOTE - ASSESSMENT
39F  39.1wks admitted for a scheduled IOL for AMA.    -Cervical changes noted  -Pain control: epidural bolus  -Continue EFM/toco    Carlee Lopez PA-C
39F  39.1wks admitted for a scheduled IOL for AMA.    -Cervical balloon placement discussed with pt, pt verbalized understanding and agreement  -Cook cervical balloon placed without complications, pt tolerated procedure well  -Continue PO cytotec  -Continue EFM/toco    Carlee Lopez PA-C

## 2024-04-01 NOTE — OB PROVIDER DELIVERY SUMMARY - NSSELHIDDEN_OBGYN_ALL_OB_FT
[NS_DeliveryAttending1_OBGYN_ALL_OB_FT:[id number]],[NS_DeliveryRN_OBGYN_ALL_OB_FT:[id number]=]

## 2024-04-01 NOTE — OB RN DELIVERY SUMMARY - NSSELHIDDEN_OBGYN_ALL_OB_FT
[NS_DeliveryAttending1_OBGYN_ALL_OB_FT:[id number]],[NS_DeliveryRN_OBGYN_ALL_OB_FT:[id number]=]

## 2024-04-01 NOTE — OB RN DELIVERY SUMMARY - NS_SEPSISRSKCALC_OBGYN_ALL_OB_FT
EOS calculated successfully. EOS Risk Factor: 0.5/1000 live births ([facility] national incidence); GA=39w2d; Temp=98.6; ROM=0.05; GBS='Negative'; Antibiotics='No antibiotics or any antibiotics < 2 hrs prior to birth'

## 2024-04-01 NOTE — OB PROVIDER LABOR PROGRESS NOTE - NS_SUBJECTIVE/OBJECTIVE_OBGYN_ALL_OB_FT
Pt evaluated at bedside for complaints of increased pressure and discomfort with contractions.    ICU Vital Signs Last 24 Hrs  T(C): 37 (31 Mar 2024 23:58), Max: 37 (31 Mar 2024 21:52)  T(F): 98.6 (31 Mar 2024 21:52), Max: 98.6 (31 Mar 2024 21:52)  HR: 67 (01 Apr 2024 04:27) (50 - 91)  BP: 109/61 (01 Apr 2024 04:27) (85/52 - 150/66)  RR: 18 (31 Mar 2024 23:58) (18 - 18)  SpO2: 97% (01 Apr 2024 04:26) (91% - 100%)    O2 Parameters below as of 31 Mar 2024 21:52  Patient On (Oxygen Delivery Method): room air
Pt evaluated at bedside for a cervical balloon placement. Pt has an epidural in place and had beckett catheter just placed by RN.    ICU Vital Signs Last 24 Hrs  T(C): 37 (31 Mar 2024 23:58), Max: 37 (31 Mar 2024 21:52)  T(F): 98.6 (31 Mar 2024 21:52), Max: 98.6 (31 Mar 2024 21:52)  HR: 56 (01 Apr 2024 01:31) (50 - 78)  BP: 112/59 (01 Apr 2024 01:28) (85/52 - 150/66)  RR: 18 (31 Mar 2024 23:58) (18 - 18)  SpO2: 100% (01 Apr 2024 01:31) (91% - 100%)    O2 Parameters below as of 31 Mar 2024 21:52  Patient On (Oxygen Delivery Method): room air

## 2024-04-01 NOTE — OB PROVIDER DELIVERY SUMMARY - NSPPHRISKSTATUS_OBGYN_ALL_OB
Physical Therapy    Physical Therapy Initial Assessment     Name: Yin Muhammad  :   MRN: [id number]      Date of Service: 2022    Evaluating PT:  Brooklynn Guerrero PT, DPT  [id number]     Room #:  8569/6383-B  Diagnosis:  Pneumonia of left lower lobe due to infectious organism [J18.9]  Pneumonia due to COVID-19 virus [U07.1, J12.82]  PMHx/PSHx:  Arthritis, CAD, CHF, DM, diabetic retinopathy, generalized OA, HLD, HTN, thyroid nodule     Precautions:  Falls, Droplet Plus, COVID-19  Equipment Needs:  NA    SUBJECTIVE:    Pt lives with her  in a 1 story home with 0 stairs to enter. Bedroom and bathroom are on the 1st level. Pt ambulated with no PTA. OBJECTIVE:   Initial Evaluation  Date: 22 Treatment Short Term/ Long Term   Goals   AM-PAC 6 Clicks      Was pt agreeable to Eval/treatment? Yes      Does pt have pain?  No c/o pain      Bed Mobility  Rolling: SBA  Supine to sit: SBA  Sit to supine: NT  Scooting: SBA  Modified Independent     Transfers Sit to stand: SBA  Stand to sit: SBA  Stand pivot: SBA  Modified Independent     Ambulation    40 feet x2 reps with no AD CGA  >150 feet with AAD Modified Independent     Stair negotiation: ascended and descended  NT  >2 steps with 1 rail Modified Independent     ROM BUE:  Per OT eval  BLE:  WFL     Strength BUE:  Per OT eval   BLE:  5/5     Balance Sitting EOB:  SBA  Dynamic Standing:  SBA  Sitting EOB:  Independent    Dynamic Standing:  Modified Independent       Pt is A & O x 4  Sensation:  Pt denies  numbness and tingling to extremities  Edema:  Unremarkable     Vitals:  Resting:  SpO2 95%, HR 87 bpm  Ambulation:  SpO2 92-95% room air    Therapeutic Exercises:    BLE ROM    Patient education  Pt educated on PT role, safety during functional mobility     Patient response to education:   Pt verbalized understanding Pt demonstrated skill Pt requires further education in this area   Yes  Yes  No      ASSESSMENT:    Conditions Requiring Skilled Moderate Risk Therapeutic Intervention:    []Decreased strength     []Decreased ROM  [x]Decreased functional mobility  [x]Decreased balance   [x]Decreased endurance   []Decreased posture  []Decreased sensation  []Decreased coordination   []Decreased vision  []Decreased safety awareness   []Increased pain       Comments:  Pt received supine and agreeable to PT evaluation with OT collaboration. Vitals monitored during session. Pt very pleasant. Reports she was falling at home d/t not eating and poor appetite. Today she says her appetite has improved and she was able to eat some breakfast and lunch. Strength has improved. Ambulated bed>chair with good SpO2. Ambulated to doorway and in/out of bathroom. Ambulated back to chair. Demonstrates mildly unsteady gait, at times reach for environment to steady herself but no LOB noted. Pt encouraged to spend time up in chair with assistance and ambulate in room with assistance only. Pt understood. Pt left in chair with OT present with call button in reach, lines attached, and needs met. Treatment:  Patient practiced and was instructed in the following treatment:     Bed mobility training - pt given verbal and tactile cues to facilitate proper sequencing and safety during rolling and supine>sit as well as provided with physical assistance to complete task    STS and pivot transfer training - pt educated on proper hand and foot placement, safety and sequencing, and use of no AD to safely complete sit<>stand and pivot transfers with hands on assistance to complete task safely    Gait training- pt was given verbal and tactile cues to facilitate safety/balance during ambulation as well as provided with physical assistance. Pt's/ family goals   1. home    Prognosis is yes  for reaching above PT goals. Patient and or family understand(s) diagnosis, prognosis, and plan of care.   Yes     PHYSICAL THERAPY PLAN OF CARE:    PT POC is established based on physician order and patient diagnosis     Referring provider/PT Order:    01/05/22 1000  PT eval and treat  Start:  01/05/22 1000,   End:  01/05/22 1000,   ONE TIME,   Standing Count:  1 Occurrences,   R         Yasir DO [place]       Diagnosis:  Pneumonia of left lower lobe due to infectious organism [J18.9]  Pneumonia due to COVID-19 virus [U07.1, J12.82]  Specific instructions for next treatment:  Gait training, fall prevention     Current Treatment Recommendations:     [x] Strengthening to improve independence with functional mobility   [] ROM to improve independence with functional mobility   [x] Balance Training to improve static/dynamic balance and to reduce fall risk  [x] Endurance Training to improve activity tolerance during functional mobility   [x] Transfer Training to improve safety and independence with all functional transfers   [x] Gait Training to improve gait mechanics, endurance and asses need for appropriate assistive device  [x] Stair Training in preparation for safe discharge home and/or into the community   [] Positioning to prevent skin breakdown and contractures  [x] Safety and Education Training   [x] Patient/Caregiver Education   [] HEP  [] Other     PT long term treatment goals are located in above grid    Frequency of treatments: 2-5x/week x 1-2 weeks. Time in  1318  Time out  1348    Total Treatment Time  8 minutes     Evaluation Time includes thorough review of current medical information, gathering information on past medical history/social history and prior level of function, completion of standardized testing/informal observation of tasks, assessment of data and education on plan of care and goals.     CPT codes:  [x] Low Complexity PT evaluation 23340  [] Moderate Complexity PT evaluation 65212  [] High Complexity PT evaluation 45123  [] PT Re-evaluation 30762  [] Gait training 27310 -- minutes  [] Manual therapy [id number] -- minutes  [x] Therapeutic activities 66685 8 minutes  [] Therapeutic exercises 66536 -- minutes  [] Neuromuscular reeducation 17581 -- minutes     Jacy Oconnell, PT, DPT  [id number]

## 2024-04-01 NOTE — OB NEONATOLOGY/PEDIATRICIAN DELIVERY SUMMARY - NSPEDSNEONOTESA_OBGYN_ALL_OB_FT
Peds requested at delivery for meconium. Baby girl, SGA, born on  (05:38) at 39.2 wks via  to a 40 y/o , A+ blood type mother. Maternal history of IVF, PCOS, MISx7 w/ D+C x2, hysteroscopy x2, NSVDx3. No significant prenatal history. PNL nr/immune/-, GBS - on . AROM at 05:35 with meconium fluids. Baby was w/d/s/s with APGARS of 9/9, BMx1. Mom would like to formula feed. Tmax: 37C. EOS: 0.04.    Physical Exam:  Gen: NAD, +grimace  HEENT: anterior fontanel open soft and flat, no cleft lip/palate, ears normal set, no ear pits or tags. no lesions in mouth/throat, nares clinically patent  Resp: no increased work of breathing, good air entry b/l, clear to auscultation bilaterally  Cardio: Normal S1/S2, regular rate and rhythm, no murmurs, rubs or gallops  Abd: soft, non tender, non distended, + bowel sounds, umbilical cord with 3 vessels  Neuro: +grasp/suck/abida, normal tone  Extremities: negative khan and ortolani, moving all extremities, full range of motion x 4, no crepitus  Skin: pink, warm  Genitals: Normal female anatomy, Garret 1, anus patent

## 2024-04-01 NOTE — OB RN DELIVERY SUMMARY - AMNIOTIC FLUID ODOR, LABOR
normal
6 y/o M w/ no significant PMHx presents to URGI c/o sudden onset increased urinary frequency since 9:00PM today. Reports pt is urinating almost every minute and having difficulty retaining his urine. Called PMD who advised pt to be seen at an ED. Last BM was normal and noted to be in the last day. For dinner pt had vegetables, chick peas, chicken, tea, and juice. Denies other complaints.

## 2024-04-02 ENCOUNTER — TRANSCRIPTION ENCOUNTER (OUTPATIENT)
Age: 39
End: 2024-04-02

## 2024-04-02 VITALS
OXYGEN SATURATION: 96 % | DIASTOLIC BLOOD PRESSURE: 69 MMHG | RESPIRATION RATE: 18 BRPM | SYSTOLIC BLOOD PRESSURE: 115 MMHG | HEART RATE: 69 BPM | TEMPERATURE: 97 F

## 2024-04-02 PROCEDURE — 85025 COMPLETE CBC W/AUTO DIFF WBC: CPT

## 2024-04-02 PROCEDURE — 59050 FETAL MONITOR W/REPORT: CPT

## 2024-04-02 PROCEDURE — 86850 RBC ANTIBODY SCREEN: CPT

## 2024-04-02 PROCEDURE — 86900 BLOOD TYPING SEROLOGIC ABO: CPT

## 2024-04-02 PROCEDURE — 86780 TREPONEMA PALLIDUM: CPT

## 2024-04-02 PROCEDURE — 86901 BLOOD TYPING SEROLOGIC RH(D): CPT

## 2024-04-02 RX ORDER — LORATADINE 10 MG/1
10 TABLET ORAL ONCE
Refills: 0 | Status: COMPLETED | OUTPATIENT
Start: 2024-04-02 | End: 2024-04-02

## 2024-04-02 RX ORDER — METFORMIN HYDROCHLORIDE 850 MG/1
1 TABLET ORAL
Refills: 0 | DISCHARGE

## 2024-04-02 RX ORDER — ACETAMINOPHEN 500 MG
3 TABLET ORAL
Qty: 0 | Refills: 0 | DISCHARGE
Start: 2024-04-02

## 2024-04-02 RX ORDER — IBUPROFEN 200 MG
1 TABLET ORAL
Qty: 0 | Refills: 0 | DISCHARGE
Start: 2024-04-02

## 2024-04-02 RX ADMIN — Medication 975 MILLIGRAM(S): at 03:11

## 2024-04-02 RX ADMIN — Medication 600 MILLIGRAM(S): at 05:01

## 2024-04-02 RX ADMIN — LORATADINE 10 MILLIGRAM(S): 10 TABLET ORAL at 09:02

## 2024-04-02 RX ADMIN — Medication 975 MILLIGRAM(S): at 09:01

## 2024-04-02 RX ADMIN — METFORMIN HYDROCHLORIDE 500 MILLIGRAM(S): 850 TABLET ORAL at 11:52

## 2024-04-02 RX ADMIN — Medication 600 MILLIGRAM(S): at 11:50

## 2024-04-02 RX ADMIN — Medication 975 MILLIGRAM(S): at 10:00

## 2024-04-02 RX ADMIN — Medication 600 MILLIGRAM(S): at 05:38

## 2024-04-02 RX ADMIN — METFORMIN HYDROCHLORIDE 500 MILLIGRAM(S): 850 TABLET ORAL at 05:01

## 2024-04-02 RX ADMIN — Medication 975 MILLIGRAM(S): at 03:45

## 2024-04-02 RX ADMIN — Medication 600 MILLIGRAM(S): at 12:50

## 2024-04-02 RX ADMIN — Medication 1 TABLET(S): at 11:50

## 2024-04-02 RX ADMIN — Medication 600 MILLIGRAM(S): at 00:27

## 2024-04-02 NOTE — DISCHARGE NOTE OB - NS MD DC FALL RISK RISK
For information on Fall & Injury Prevention, visit: [email or web address] OR  [email or web address] OR  https://www.cdc.gov/steadi/patient.html

## 2024-04-02 NOTE — DISCHARGE NOTE OB - CARE PROVIDER_API CALL
Miguel Ángel Kelsey  Obstetrics and Gynecology  [address] [place] [address], [address]  [place] [address]  Phone: [phone]  Fax: [phone]  Follow Up Time:

## 2024-04-02 NOTE — PROGRESS NOTE ADULT - SUBJECTIVE AND OBJECTIVE BOX
OB Progress Note:  PPD#1    S: 40yo  PPD#1 s/p . Patient feels well but reports allergic rash on abd where her underwear sits. She has removed hospital underwear to improvement.   Pain is well controlled, tolerating regular diet, passing flatus, voiding spontaneously, ambulating without difficulty. Denies heavy vaginal bleeding, CP/SOB, N/V, lightheadedness/dizziness.     O:  Vitals:  Vital Signs Last 24 Hrs  T(C): 36.7 (2024 06:05), Max: 36.9 (2024 07:57)  T(F): 98.1 (2024 06:05), Max: 98.4 (2024 07:57)  HR: 58 (2024 06:05) (52 - 97)  BP: 102/62 (2024 06:05) (101/60 - 118/61)  BP(mean): --  RR: 18 (2024 06:05) (17 - 18)  SpO2: 97% (2024 06:05) (92% - 100%)    Parameters below as of 2024 06:05  Patient On (Oxygen Delivery Method): room air        MEDICATIONS  (STANDING):  acetaminophen     Tablet .. 975 milliGRAM(s) Oral <User Schedule>  diphtheria/tetanus/pertussis (acellular) Vaccine (Adacel) 0.5 milliLiter(s) IntraMuscular once  ibuprofen  Tablet. 600 milliGRAM(s) Oral every 6 hours  metFORMIN 500 milliGRAM(s) Oral three times a day  oxytocin Infusion 333.333 milliUNIT(s)/Min (1000 mL/Hr) IV Continuous <Continuous>  oxytocin Infusion 41.667 milliUNIT(s)/Min (125 mL/Hr) IV Continuous <Continuous>  oxytocin Infusion. 4 milliUNIT(s)/Min (4 mL/Hr) IV Continuous <Continuous>  prenatal multivitamin 1 Tablet(s) Oral daily  sodium chloride 0.9% lock flush 3 milliLiter(s) IV Push every 8 hours      Labs:  Blood type: A Positive  Rubella IgG: RPR: Negative                          12.3   8.75 >-----------< 209    (  @ 21:32 )             37.9                  Physical Exam:  General: NAD  CV: RRR  Resp: CTAB  Abdomen: soft, non-tender, non-distended, fundus firm, erythematous area on anterior abd underwear line   : Nl lochia  Extremities: No erythema/edema     OB Progress Note:  PPD#1    S: 40yo  PPD#1 s/p . Patient feels well but reports allergic rash on abd where her underwear sits. Rash is itchy. Tried hydrocortisone ointment to no relief. She has removed hospital underwear to improvement.   Pain is well controlled, tolerating regular diet, passing flatus, voiding spontaneously, ambulating without difficulty. Denies heavy vaginal bleeding, CP/SOB, N/V, lightheadedness/dizziness.     O:  Vitals:  Vital Signs Last 24 Hrs  T(C): 36.7 (2024 06:05), Max: 36.9 (2024 07:57)  T(F): 98.1 (2024 06:05), Max: 98.4 (2024 07:57)  HR: 58 (2024 06:05) (52 - 97)  BP: 102/62 (2024 06:05) (101/60 - 118/61)  BP(mean): --  RR: 18 (2024 06:05) (17 - 18)  SpO2: 97% (2024 06:05) (92% - 100%)    Parameters below as of 2024 06:05  Patient On (Oxygen Delivery Method): room air        MEDICATIONS  (STANDING):  acetaminophen     Tablet .. 975 milliGRAM(s) Oral <User Schedule>  diphtheria/tetanus/pertussis (acellular) Vaccine (Adacel) 0.5 milliLiter(s) IntraMuscular once  ibuprofen  Tablet. 600 milliGRAM(s) Oral every 6 hours  metFORMIN 500 milliGRAM(s) Oral three times a day  oxytocin Infusion 333.333 milliUNIT(s)/Min (1000 mL/Hr) IV Continuous <Continuous>  oxytocin Infusion 41.667 milliUNIT(s)/Min (125 mL/Hr) IV Continuous <Continuous>  oxytocin Infusion. 4 milliUNIT(s)/Min (4 mL/Hr) IV Continuous <Continuous>  prenatal multivitamin 1 Tablet(s) Oral daily  sodium chloride 0.9% lock flush 3 milliLiter(s) IV Push every 8 hours      Labs:  Blood type: A Positive  Rubella IgG: RPR: Negative                          12.3   8.75 >-----------< 209    ( 03-31 @ 21:32 )             37.9                  Physical Exam:  General: NAD  CV: RRR  Resp: CTAB  Abdomen: soft, non-tender, non-distended, fundus firm, erythematous area on anterior abd underwear line   : Nl lochia  Extremities: No erythema/edema

## 2024-04-02 NOTE — DISCHARGE NOTE OB - CRACKED, BLEEDING NIPPLES
Statement Selected MEDICATIONS  (PRN):  ibuprofen  Tablet. 400 milliGRAM(s) Oral every 6 hours PRN Moderate Pain (4 - 6)  LORazepam     Tablet 0.5 milliGRAM(s) Oral three times a day PRN moderate to severe anxiety  polyethylene glycol 3350 17 Gram(s) Oral at bedtime PRN constipation  traZODone 50 milliGRAM(s) Oral at bedtime PRN insomnia

## 2024-04-02 NOTE — PROGRESS NOTE ADULT - ASSESSMENT
A/P: 38yo PPD#1 s/p  with likely allergic contact dermatitis.  Patient is stable and doing well post-partum.   - Pain well controlled, continue current pain regimen  - Increase ambulation, SCDs when not ambulating  - Continue regular diet    #Allergic contact dermatitis  - Recommended Benadryl, pt declined  - Discussed sx to monitor for and advised pt to contact medical team should sx worsen    Wendi Dang MD PGY1 A/P: 40yo PPD#1 s/p  with likely allergic contact dermatitis.  Patient is stable and doing well post-partum.   - Pain well controlled, continue current pain regimen  - Increase ambulation, SCDs when not ambulating  - Continue regular diet    #Allergic contact dermatitis  - Pt has tried hydrocortisone ointment to no relief  - Recommended Benadryl, pt declined  - Discussed sx to monitor for and advised pt to contact medical team should sx worsen    Wendi Dang MD PGY1

## 2024-04-02 NOTE — DISCHARGE NOTE OB - PATIENT PORTAL LINK FT
You can access the FollowMyHealth Patient Portal offered by [facility] by registering at the following website: [email or web address]. By joining [facility]’s FollowMyHealth portal, you will also be able to view your health information using other applications (apps) compatible with our system.

## 2024-04-08 PROBLEM — O02.1 MISSED ABORTION: Chronic | Status: ACTIVE | Noted: 2024-03-31

## 2024-05-14 ENCOUNTER — APPOINTMENT (OUTPATIENT)
Dept: OBGYN | Facility: CLINIC | Age: 39
End: 2024-05-14
Payer: COMMERCIAL

## 2024-05-14 PROCEDURE — 0503F POSTPARTUM CARE VISIT: CPT

## 2024-05-31 ENCOUNTER — APPOINTMENT (OUTPATIENT)
Dept: OBGYN | Facility: CLINIC | Age: 39
End: 2024-05-31

## 2024-05-31 PROCEDURE — 81025 URINE PREGNANCY TEST: CPT

## 2024-05-31 PROCEDURE — 76830 TRANSVAGINAL US NON-OB: CPT | Mod: 59

## 2024-05-31 PROCEDURE — 58300 INSERT INTRAUTERINE DEVICE: CPT

## 2024-06-27 ENCOUNTER — APPOINTMENT (OUTPATIENT)
Dept: OBGYN | Facility: CLINIC | Age: 39
End: 2024-06-27

## 2024-06-27 PROCEDURE — 99212 OFFICE O/P EST SF 10 MIN: CPT | Mod: 25

## 2024-06-27 PROCEDURE — 76830 TRANSVAGINAL US NON-OB: CPT

## 2024-07-08 ENCOUNTER — APPOINTMENT (OUTPATIENT)
Dept: ULTRASOUND IMAGING | Facility: CLINIC | Age: 39
End: 2024-07-08
Payer: COMMERCIAL

## 2024-07-08 ENCOUNTER — APPOINTMENT (OUTPATIENT)
Dept: MAMMOGRAPHY | Facility: CLINIC | Age: 39
End: 2024-07-08
Payer: COMMERCIAL

## 2024-07-08 PROCEDURE — 77067 SCR MAMMO BI INCL CAD: CPT

## 2024-07-08 PROCEDURE — 77063 BREAST TOMOSYNTHESIS BI: CPT

## 2024-07-08 PROCEDURE — 76641 ULTRASOUND BREAST COMPLETE: CPT | Mod: 50

## 2025-06-24 ENCOUNTER — APPOINTMENT (OUTPATIENT)
Dept: OBGYN | Facility: CLINIC | Age: 40
End: 2025-06-24
Payer: COMMERCIAL

## 2025-06-24 PROCEDURE — 99213 OFFICE O/P EST LOW 20 MIN: CPT | Mod: 25

## 2025-06-24 PROCEDURE — 58301 REMOVE INTRAUTERINE DEVICE: CPT

## 2025-06-24 PROCEDURE — 76830 TRANSVAGINAL US NON-OB: CPT | Mod: 59

## 2025-07-03 ENCOUNTER — APPOINTMENT (OUTPATIENT)
Dept: OBGYN | Facility: CLINIC | Age: 40
End: 2025-07-03
Payer: COMMERCIAL

## 2025-07-03 PROCEDURE — 96127 BRIEF EMOTIONAL/BEHAV ASSMT: CPT

## 2025-07-03 PROCEDURE — 99396 PREV VISIT EST AGE 40-64: CPT

## 2025-07-03 PROCEDURE — 99459 PELVIC EXAMINATION: CPT | Mod: NC

## 2025-07-14 ENCOUNTER — APPOINTMENT (OUTPATIENT)
Dept: OBGYN | Facility: CLINIC | Age: 40
End: 2025-07-14
Payer: COMMERCIAL

## 2025-07-14 PROCEDURE — 99212 OFFICE O/P EST SF 10 MIN: CPT | Mod: 25

## 2025-07-14 PROCEDURE — 76830 TRANSVAGINAL US NON-OB: CPT

## 2025-07-14 PROCEDURE — 58300 INSERT INTRAUTERINE DEVICE: CPT

## 2025-07-17 ENCOUNTER — APPOINTMENT (OUTPATIENT)
Dept: OBGYN | Facility: CLINIC | Age: 40
End: 2025-07-17

## 2025-08-14 ENCOUNTER — APPOINTMENT (OUTPATIENT)
Dept: OBGYN | Facility: CLINIC | Age: 40
End: 2025-08-14
Payer: COMMERCIAL

## 2025-08-14 PROCEDURE — 99212 OFFICE O/P EST SF 10 MIN: CPT | Mod: 25

## 2025-08-14 PROCEDURE — 99459 PELVIC EXAMINATION: CPT

## 2025-08-14 PROCEDURE — 76830 TRANSVAGINAL US NON-OB: CPT
